# Patient Record
Sex: MALE | Race: WHITE | HISPANIC OR LATINO | Employment: UNEMPLOYED | URBAN - METROPOLITAN AREA
[De-identification: names, ages, dates, MRNs, and addresses within clinical notes are randomized per-mention and may not be internally consistent; named-entity substitution may affect disease eponyms.]

---

## 2017-01-01 ENCOUNTER — ALLSCRIPTS OFFICE VISIT (OUTPATIENT)
Dept: OTHER | Facility: OTHER | Age: 67
End: 2017-01-01

## 2017-01-04 ENCOUNTER — GENERIC CONVERSION - ENCOUNTER (OUTPATIENT)
Dept: OTHER | Facility: OTHER | Age: 67
End: 2017-01-04

## 2017-03-08 ENCOUNTER — HOSPITAL ENCOUNTER (OUTPATIENT)
Dept: CT IMAGING | Facility: HOSPITAL | Age: 67
Discharge: HOME | End: 2017-03-08
Attending: INTERNAL MEDICINE
Payer: COMMERCIAL

## 2017-03-08 DIAGNOSIS — R91.1 SOLITARY PULMONARY NODULE: ICD-10-CM

## 2017-03-08 PROCEDURE — 71250 CT THORAX DX C-: CPT

## 2017-10-27 NOTE — PROGRESS NOTES
Assessment  Assessed    1  Cardiac pacemaker in situ (V45 01) (Z95 0)   2  AV block (426 10) (I44 30)   3  Hypertension (401 9) (I10)   4  Prinzmetal angina (413 1) (I20 1)    Plan  Prinzmetal angina    · Nitroglycerin 0 4 MG Sublingual Tablet Sublingual; PLACE 1 TABLET UNDER  YOUR TONGUE EVERY 5 MINUTES AS NEEDED FOR CHEST PAIN   Rx By: Mauro Lala; Dispense: 0 Days ; #:30 Tablet Sublingual; Refill: 11; For: Prinzmetal angina; LAUREN = N; [facility] To: [id number]   · Follow-up visit in 6 months Evaluation and Treatment  Follow-up  Status: Hold For -  Scheduling  Requested for: 55RJK5910   Ordered; For: Prinzmetal angina; Ordered By: Mauro Lala Performed:  Due: 74WSS6302    Discussion/Summary  Cardiology Discussion Summary Free Text Note Form [facility]:   #1  Prinzmetal angina/coronary vasospasm: Review of hospital records, reviewed EKG in ICU at the time of significant chest pain and significant transient ST segment elevation, very much consistent with coronary vasospasm  Sublingual nitroglycerin did help  Continue nifedipine, isosorbide  Patient to carry sublingual nitroglycerin, which he does, and use as needed  Educated pt on use of SLNTG, as he uses it anytime he feels something funny in the chest, not necessarily pain  Patient is to call 911 if chest pain continues to accelerate or his refractory despite sublingual nitroglycerin use  History of symptomatic bradycardia, status post remote permanent pacemaker placement, [employer] device: We'll continue device interrogation  Prior interrogation report reviewed  Hypertension: Blood pressure elevated today, however patient did not take antihypertensives this AM before visit  Encouraged compliance, especially to prevent recurrent coronary vasospasm  COPD: F/U with pulmonary medicine  in 6 months, he will call if any changes  Would add verapamil if recurrent and refractory angina        Chief Complaint  Chief Complaint Free Text Note Form: Coronary vasospasm       History of Present Illness  [facility] [place] Free Text Note Form [facility]: This is a 61-year-old male with a significant history of permanent pacemaker implantation in the past, [employer] device, as well as a history of tobacco abuse and COPD, who has been following Dr Susan Sommer, initially seen in consultation October 2016  was hospitalized in late October into November 2016 with acute respiratory failure secondary to COPD exacerbation  While he was in the ICU, he had significant chest pain with marked ST segment elevation  Clinically, he was thought to have Prinzmetal angina secondary to beta agonist activity from multiple inhalers, with subsequent resolution of chest pain and resolution of EKG changes  He underwent cardiac catheterization on 11/2/2016 which revealed no evidence of obstructive CAD, in no angiographic evidence of vasospasm  was thereafter discharged on nifedipine and isosorbide  He was recommended to keep a symptom diary and carry around sublingual nitroglycerin wherever he goes  gets occasional chest pains that are sharp and uses SLNTG, but episodes of chest pains have not been very severe  Review of Systems  Cardiology Male ROS:     Cardiac: No complaints of chest pain, no palpitations, no fainiting -- and-- no syncope/fainting  Skin: No complaints of nonhealing sores or skin rash  Genitourinary: No complaints of recurrent urinary tract infections, frequent urination at night, difficult urination, blood in urine, kidney stones, loss of bladder control, no kidney or prostate problems, no erectile dysfunction  Psychological: No complaints of feeling depressed, anxiety, panic attacks, or difficulty concentrating  General: No complaints of trouble sleeping, lack of energy, fatigue, appetite changes, weight changes, fever, frequent infections, or night sweats  Respiratory: No complaints of shortness of breath, cough with sputum, or wheezing     HEENT: No complaints of serious problems, hearing problems, nose problems, throat problems, or snoring  Gastrointestinal: No complaints of liver problems, nausea, vomiting, heartburn, constipation, bloody stools, diarrhea, problems swallowing, adbominal pain, or rectal bleeding  Hematologic: No complaints of bleeding disorders, anemia, blood clots, or excessive brusing  Neurological: headaches, but-- no tingling-- and-- no dizziness   Musculoskeletal: arthritis, but-- no back pain-- and-- no swelling/pain       ROS Reviewed:   ROS reviewed  Active Problems  Problems    1  Acid reflux (530 81) (K21 9)   2  AV block (426 10) (I44 30)   3  Back pain (724 5) (M54 9)   4  Cardiac pacemaker in situ (V45 01) (Z95 0)   5  Cervical spondylosis (721 0) (M47 812)   6  Cervicalgia (723 1) (M54 2)   7  Chronic neck and back pain (723 1,724 5) (M54 2,M54 9)   8  COPD (chronic obstructive pulmonary disease) (496) (J44 9)   9  Hypertension (401 9) (I10)   10  Lung nodule (793 11) (R91 1)   11  Need for prophylactic vaccination and inoculation against influenza (V04 81) (Z23)   12  Peptic ulcer (533 90) (K27 9)   13  Prinzmetal angina (413 1) (I20 1)   14  Screening for colon cancer (V76 51) (Z12 11)   15  Smoking greater than 40 pack years (305 1) (F17 210)   16  Type 2 diabetes mellitus (250 00) (E11 9)    Past Medical History  Problems    1  History of acute bronchitis (V12 69) (Z87 09)   2  History of nicotine dependence (V15 82) (Z87 891)   3  History of Tobacco abuse (305 1) (Z72 0)  Active Problems And Past Medical History Reviewed: The active problems and past medical history were reviewed and updated today  Surgical History  Problems    1  History of Reported Hx Of Surgery For An Ulcer    Family History  Mother    1  Family history of malignant neoplasm (V16 9) (Z80 9)  Family History Reviewed: The family history was reviewed and updated today         Social History  Problems    · Alcohol use (V49 89) (Z78 9)   · Former smoker (A23 08) (I01 085)   · Smoking greater than 40 pack years (305 1) (F17 210)  Social History Reviewed: The social history was reviewed and updated today  Current Meds   1  Aspirin 81 MG Oral Tablet Delayed Release; take 1 tablet by mouth every day; Therapy: 71PCP5358 to (Evaluate:01Apr2017)  Requested for: 15Cuo4307; Last   Rx:14Vrp1497 Ordered   2  Atorvastatin Calcium 10 MG Oral Tablet; TAKE 1 TABLET DAILY; Therapy: (Recorded:09Nov2016) to Recorded   3  [employer] 2 Test In [employer]; TEST THREE TIMES DAILY; Therapy: 40XYS6273 to (Evaluate:19Mar2017)  Requested for: 46FFN5965; Last   Rx:24Txx2479 Ordered   4  BD Insulin Syringe Ultrafine 31G X 5/16 1 ML Miscellaneous; USE THREE TIMES DAILY; Therapy: 81NFY6625 to (Evaluate:66Bac1023)  Requested for: 46EED6750; Last   Rx:14Nov2016 Ordered   5  Blood Pressure Monitor Device; use for blood pressure recordings and mantain a log; Therapy: 25MJG9532 to (Evaluate:10Ekp3761)  Requested for: 74Pzt6451; Last   Rx:24Cqp9944 Ordered   6  Breo Ellipta 100-25 MCG/INH Inhalation Aerosol Powder Breath Activated; ONE   INHALATION DAILY  AFTER INHALATION RINSE MOUTH WITH WATER & SPIT  USE SAME   TIME EACH DAY, NO MORE THAN 1 TIME IN 24 HOURS; Therapy: 37XKL7102 to (Last ML:58NRM9413)  Requested for: 54FVG2946 Ordered   7  Gabapentin 300 MG Oral Capsule; TAKE 1 CAPSULE BY MOUTH TWICE DAILY    Requested for: 86CVN1645; Last Rx:87Aug6952 Ordered   8  Ipratropium-Albuterol 0 5-2 5 (3) MG/3ML Inhalation Solution; USE 1 UNIT DOSE IN   NEBULIZER EVERY 4 HOURS AS NEEDED; Therapy: 88VRQ9397 to (Last BK:48XIW1606)  Requested for: 24QXF9341 Ordered   9  Ipratropium-Albuterol 0 5-2 5 (3) MG/3ML Inhalation Solution; USE 1 UNIT DOSE IN   NEBULIZER EVERY 4 HOURS AS NEEDED; Therapy: 23FTU7846 to (Last Rx:14Nov2016)  Requested for: 14YSF4769 Ordered   10   Isosorbide Mononitrate ER 60 MG Oral Tablet Extended Release 24 Hour; TAKE 1    TABLET ONCE DAILY  Requested for: 02Dec2016; Last Rx:02Dec2016 Ordered   11  Levemir 100 UNIT/ML Subcutaneous Solution; Inject 30 units in the evening; Therapy: 13LRJ5400 to Recorded   12  Lisinopril 20 MG Oral Tablet; TAKE 1 TABLET DAILY  Requested for: 19NAO7468; Last    Rx:39Lra1011 Ordered   13  MetFORMIN HCl - 850 MG Oral Tablet; TAKE 1 TABLET BY MOUTH DAILY WITH    BREAKFEST  Requested for: 69MSW7280; Last MS:24IIM3505 Ordered   14  Mucinex 600 MG Oral Tablet Extended Release 12 Hour; TAKE 1 TABLET EVERY 12    HOURS; Therapy: (Recorded:09Nov2016) to Recorded   15  Nitroglycerin 0 4 MG Sublingual Tablet Sublingual; PLACE 1 TABLET UNDER YOUR    TONGUE EVERY 5 MINUTES AS NEEDED FOR CHEST PAIN;    Therapy: (Recorded:09Nov2016) to Recorded   16  Omeprazole 20 MG Oral Capsule Delayed Release; take 1 capsule by mouth once daily; Therapy: 98JNN2626 to (Evaluate:01Apr2017)  Requested for: 88Rjt0970; Last    Rx:74Uyo0534 Ordered  Medication List Reviewed: The medication list was reviewed and updated today  Allergies  Medication    1  No Known Drug Allergies  Non-Medication    2  No Known Environmental Allergies   3  No Known Food Allergies    Vitals  Vital Signs    Recorded: 65CGW3818 08:39AM   Heart Rate 80   Systolic 800   Diastolic 80   Height 5 ft 7 in   Weight 204 lb    BMI Calculated 31 95   BSA Calculated 2 04     Physical Exam    Constitutional   General appearance: No acute distress, well appearing and well nourished  Eyes   Conjunctiva and Sclera examination: Conjunctiva pink, sclera anicteric  Ears, Nose, Mouth, and Throat - Oropharynx: Clear, nares are clear, mucous membranes are moist    Neck   Neck and thyroid: Normal, supple, trachea midline, no thyromegaly  Pulmonary   Respiratory effort: No increased work of breathing or signs of respiratory distress  Auscultation of lungs: Clear to auscultation, no rales, no rhonchi, no wheezing, good air movement      Cardiovascular   Auscultation of heart: Normal rate and rhythm, normal S1 and S2, no murmurs  Carotid pulses: Normal, 2+ bilaterally  Peripheral vascular exam: Normal pulses throughout, no tenderness, erythema or swelling  Pedal pulses: Normal, 2+ bilaterally  Examination of extremities for edema and/or varicosities: Normal     Abdomen   Abdomen: Non-tender and no distention  Liver and spleen: No hepatomegaly or splenomegaly  Musculoskeletal Gait and station: Normal gait  -- Digits and nails: Normal without clubbing or cyanosis  -- Inspection/palpation of joints, bones, and muscles: Normal, ROM normal     Skin - Skin and subcutaneous tissue: Normal without rashes or lesions  Skin is warm and well perfused, normal turgor  Neurologic - Cranial nerves: II - XII intact  -- Speech: Normal     Psychiatric - Orientation to person, place, and time: Normal -- Mood and affect: Normal       Future Appointments    Date/Time Provider Specialty Site   10/10/2017 01:30 PM Cardiology, [address] [facility]     Signatures   Electronically signed by : Florence DO Darek; Oct  4 2017  9:07AM EST                       (Author)

## 2018-01-01 ENCOUNTER — CLINICAL SUPPORT (OUTPATIENT)
Dept: CARDIOLOGY CLINIC | Facility: CLINIC | Age: 68
End: 2018-01-01
Payer: COMMERCIAL

## 2018-01-01 ENCOUNTER — APPOINTMENT (EMERGENCY)
Dept: RADIOLOGY | Facility: HOSPITAL | Age: 68
DRG: 208 | End: 2018-01-01
Payer: COMMERCIAL

## 2018-01-01 ENCOUNTER — TELEPHONE (OUTPATIENT)
Dept: CARDIOLOGY CLINIC | Facility: CLINIC | Age: 68
End: 2018-01-01

## 2018-01-01 ENCOUNTER — TRANSCRIBE ORDERS (OUTPATIENT)
Dept: ADMINISTRATIVE | Facility: HOSPITAL | Age: 68
End: 2018-01-01

## 2018-01-01 ENCOUNTER — HOSPITAL ENCOUNTER (INPATIENT)
Facility: HOSPITAL | Age: 68
LOS: 1 days | DRG: 208 | End: 2018-04-28
Attending: EMERGENCY MEDICINE | Admitting: INTERNAL MEDICINE
Payer: COMMERCIAL

## 2018-01-01 ENCOUNTER — ALLSCRIPTS OFFICE VISIT (OUTPATIENT)
Dept: OTHER | Facility: OTHER | Age: 68
End: 2018-01-01

## 2018-01-01 VITALS
OXYGEN SATURATION: 99 % | WEIGHT: 204 LBS | SYSTOLIC BLOOD PRESSURE: 124 MMHG | HEIGHT: 67 IN | BODY MASS INDEX: 32.02 KG/M2 | TEMPERATURE: 97.7 F | RESPIRATION RATE: 16 BRPM | DIASTOLIC BLOOD PRESSURE: 70 MMHG | HEART RATE: 67 BPM

## 2018-01-01 VITALS
RESPIRATION RATE: 156 BRPM | BODY MASS INDEX: 33.25 KG/M2 | SYSTOLIC BLOOD PRESSURE: 108 MMHG | HEART RATE: 138 BPM | DIASTOLIC BLOOD PRESSURE: 51 MMHG | TEMPERATURE: 98.5 F | WEIGHT: 212.3 LBS | OXYGEN SATURATION: 98 %

## 2018-01-01 VITALS
TEMPERATURE: 98.4 F | HEART RATE: 63 BPM | SYSTOLIC BLOOD PRESSURE: 162 MMHG | OXYGEN SATURATION: 96 % | WEIGHT: 213 LBS | BODY MASS INDEX: 33.43 KG/M2 | DIASTOLIC BLOOD PRESSURE: 74 MMHG | HEIGHT: 67 IN | RESPIRATION RATE: 16 BRPM

## 2018-01-01 VITALS
SYSTOLIC BLOOD PRESSURE: 150 MMHG | DIASTOLIC BLOOD PRESSURE: 80 MMHG | HEART RATE: 80 BPM | BODY MASS INDEX: 32.02 KG/M2 | WEIGHT: 204 LBS | HEIGHT: 67 IN

## 2018-01-01 DIAGNOSIS — J44.1 COPD EXACERBATION (HCC): ICD-10-CM

## 2018-01-01 DIAGNOSIS — Z95.0 CARDIAC PACEMAKER: Primary | ICD-10-CM

## 2018-01-01 DIAGNOSIS — J96.00 ACUTE RESPIRATORY FAILURE (HCC): Primary | ICD-10-CM

## 2018-01-01 DIAGNOSIS — R91.1 COIN LESION: Primary | ICD-10-CM

## 2018-01-01 DIAGNOSIS — I45.2 BIFASCICULAR BLOCK: ICD-10-CM

## 2018-01-01 DIAGNOSIS — I25.10 CORONARY ARTERY DISEASE INVOLVING NATIVE HEART WITHOUT ANGINA PECTORIS, UNSPECIFIED VESSEL OR LESION TYPE: Primary | ICD-10-CM

## 2018-01-01 DIAGNOSIS — R55 SYNCOPE AND COLLAPSE: ICD-10-CM

## 2018-01-01 DIAGNOSIS — I10 HYPERTENSION, UNSPECIFIED TYPE: Primary | ICD-10-CM

## 2018-01-01 LAB
ANION GAP BLD CALC-SCNC: 14 MMOL/L (ref 4–13)
ANION GAP SERPL CALCULATED.3IONS-SCNC: 8 MMOL/L (ref 4–13)
ARTERIAL PATENCY WRIST A: YES
BASE EXCESS BLDA CALC-SCNC: -3.9 MMOL/L
BASOPHILS # BLD MANUAL: 0 THOUSAND/UL (ref 0–0.1)
BASOPHILS NFR MAR MANUAL: 0 % (ref 0–1)
BUN BLD-MCNC: 23 MG/DL (ref 5–25)
BUN SERPL-MCNC: 21 MG/DL (ref 5–25)
CA-I BLD-SCNC: 1.16 MMOL/L (ref 1.12–1.32)
CALCIUM SERPL-MCNC: 8.5 MG/DL (ref 8.3–10.1)
CHLORIDE BLD-SCNC: 102 MMOL/L (ref 100–108)
CHLORIDE SERPL-SCNC: 102 MMOL/L (ref 100–108)
CO2 SERPL-SCNC: 30 MMOL/L (ref 21–32)
CREAT BLD-MCNC: 1.1 MG/DL (ref 0.6–1.3)
CREAT SERPL-MCNC: 1.35 MG/DL (ref 0.6–1.3)
EOSINOPHIL # BLD MANUAL: 0.22 THOUSAND/UL (ref 0–0.4)
EOSINOPHIL NFR BLD MANUAL: 1 % (ref 0–6)
ERYTHROCYTE [DISTWIDTH] IN BLOOD BY AUTOMATED COUNT: 14.4 % (ref 11.6–15.1)
GFR SERPL CREATININE-BSD FRML MDRD: 55 ML/MIN/1.73SQ M
GFR SERPL CREATININE-BSD FRML MDRD: 70 ML/MIN/1.73SQ M
GLUCOSE SERPL-MCNC: 287 MG/DL (ref 65–140)
GLUCOSE SERPL-MCNC: 292 MG/DL (ref 65–140)
HCO3 BLDA-SCNC: 23.7 MMOL/L (ref 22–28)
HCT VFR BLD AUTO: 50.8 % (ref 36.5–49.3)
HCT VFR BLD CALC: 44 % (ref 36.5–49.3)
HGB BLD-MCNC: 15.5 G/DL (ref 12–17)
HGB BLDA-MCNC: 15 G/DL (ref 12–17)
HOROWITZ INDEX BLDA+IHG-RTO: 70 MM[HG]
LACTATE SERPL-SCNC: 1.5 MMOL/L (ref 0.5–2)
LACTATE SERPL-SCNC: 3.1 MMOL/L (ref 0.5–2)
LYMPHOCYTES # BLD AUTO: 33 % (ref 14–44)
LYMPHOCYTES # BLD AUTO: 7.18 THOUSAND/UL (ref 0.6–4.47)
MCH RBC QN AUTO: 25.7 PG (ref 26.8–34.3)
MCHC RBC AUTO-ENTMCNC: 30.5 G/DL (ref 31.4–37.4)
MCV RBC AUTO: 84 FL (ref 82–98)
MONOCYTES # BLD AUTO: 0.44 THOUSAND/UL (ref 0–1.22)
MONOCYTES NFR BLD: 2 % (ref 4–12)
NEUTROPHILS # BLD MANUAL: 13.93 THOUSAND/UL (ref 1.85–7.62)
NEUTS SEG NFR BLD AUTO: 64 % (ref 43–75)
NT-PROBNP SERPL-MCNC: 169 PG/ML
O2 CT BLDA-SCNC: 18.2 ML/DL (ref 16–23)
OXYHGB MFR BLDA: 99.4 % (ref 94–97)
PCO2 BLD: 28 MMOL/L (ref 21–32)
PCO2 BLDA: 54.4 MM HG (ref 36–44)
PEEP RESPIRATORY: 5 CM[H2O]
PH BLDA: 7.26 [PH] (ref 7.35–7.45)
PLATELET # BLD AUTO: 293 THOUSANDS/UL (ref 149–390)
PLATELET BLD QL SMEAR: ADEQUATE
PMV BLD AUTO: 10 FL (ref 8.9–12.7)
PO2 BLDA: 372.1 MM HG (ref 75–129)
POTASSIUM BLD-SCNC: 4.3 MMOL/L (ref 3.5–5.3)
POTASSIUM SERPL-SCNC: 4.4 MMOL/L (ref 3.5–5.3)
RBC # BLD AUTO: 6.02 MILLION/UL (ref 3.88–5.62)
RBC MORPH BLD: NORMAL
SODIUM BLD-SCNC: 139 MMOL/L (ref 136–145)
SODIUM SERPL-SCNC: 140 MMOL/L (ref 136–145)
SPECIMEN SOURCE: ABNORMAL
SPECIMEN SOURCE: ABNORMAL
TOTAL CELLS COUNTED SPEC: 100
TROPONIN I SERPL-MCNC: 0.02 NG/ML
VENT AC: 16
VENT- AC: AC
VT SETTING VENT: 500 ML
WBC # BLD AUTO: 21.77 THOUSAND/UL (ref 4.31–10.16)

## 2018-01-01 PROCEDURE — 99291 CRITICAL CARE FIRST HOUR: CPT

## 2018-01-01 PROCEDURE — 36600 WITHDRAWAL OF ARTERIAL BLOOD: CPT

## 2018-01-01 PROCEDURE — 94002 VENT MGMT INPAT INIT DAY: CPT

## 2018-01-01 PROCEDURE — 71045 X-RAY EXAM CHEST 1 VIEW: CPT

## 2018-01-01 PROCEDURE — 84484 ASSAY OF TROPONIN QUANT: CPT | Performed by: EMERGENCY MEDICINE

## 2018-01-01 PROCEDURE — 93294 REM INTERROG EVL PM/LDLS PM: CPT | Performed by: INTERNAL MEDICINE

## 2018-01-01 PROCEDURE — 85027 COMPLETE CBC AUTOMATED: CPT | Performed by: EMERGENCY MEDICINE

## 2018-01-01 PROCEDURE — 94660 CPAP INITIATION&MGMT: CPT

## 2018-01-01 PROCEDURE — 87040 BLOOD CULTURE FOR BACTERIA: CPT | Performed by: EMERGENCY MEDICINE

## 2018-01-01 PROCEDURE — 93296 REM INTERROG EVL PM/IDS: CPT | Performed by: INTERNAL MEDICINE

## 2018-01-01 PROCEDURE — 99236 HOSP IP/OBS SAME DATE HI 85: CPT | Performed by: NURSE PRACTITIONER

## 2018-01-01 PROCEDURE — 94644 CONT INHLJ TX 1ST HOUR: CPT

## 2018-01-01 PROCEDURE — 80048 BASIC METABOLIC PNL TOTAL CA: CPT | Performed by: EMERGENCY MEDICINE

## 2018-01-01 PROCEDURE — 83605 ASSAY OF LACTIC ACID: CPT | Performed by: NURSE PRACTITIONER

## 2018-01-01 PROCEDURE — 85007 BL SMEAR W/DIFF WBC COUNT: CPT | Performed by: EMERGENCY MEDICINE

## 2018-01-01 PROCEDURE — 96375 TX/PRO/DX INJ NEW DRUG ADDON: CPT

## 2018-01-01 PROCEDURE — 5A1935Z RESPIRATORY VENTILATION, LESS THAN 24 CONSECUTIVE HOURS: ICD-10-PCS | Performed by: EMERGENCY MEDICINE

## 2018-01-01 PROCEDURE — 0BH17EZ INSERTION OF ENDOTRACHEAL AIRWAY INTO TRACHEA, VIA NATURAL OR ARTIFICIAL OPENING: ICD-10-PCS | Performed by: EMERGENCY MEDICINE

## 2018-01-01 PROCEDURE — 96365 THER/PROPH/DIAG IV INF INIT: CPT

## 2018-01-01 PROCEDURE — 80047 BASIC METABLC PNL IONIZED CA: CPT

## 2018-01-01 PROCEDURE — 83605 ASSAY OF LACTIC ACID: CPT | Performed by: EMERGENCY MEDICINE

## 2018-01-01 PROCEDURE — 36415 COLL VENOUS BLD VENIPUNCTURE: CPT | Performed by: EMERGENCY MEDICINE

## 2018-01-01 PROCEDURE — 96361 HYDRATE IV INFUSION ADD-ON: CPT

## 2018-01-01 PROCEDURE — 83880 ASSAY OF NATRIURETIC PEPTIDE: CPT | Performed by: EMERGENCY MEDICINE

## 2018-01-01 PROCEDURE — 85014 HEMATOCRIT: CPT

## 2018-01-01 PROCEDURE — 82805 BLOOD GASES W/O2 SATURATION: CPT | Performed by: EMERGENCY MEDICINE

## 2018-01-01 PROCEDURE — 93005 ELECTROCARDIOGRAM TRACING: CPT

## 2018-01-01 RX ORDER — HYDRALAZINE HYDROCHLORIDE 20 MG/ML
10 INJECTION INTRAMUSCULAR; INTRAVENOUS EVERY 6 HOURS PRN
Status: DISCONTINUED | OUTPATIENT
Start: 2018-01-01 | End: 2018-01-01 | Stop reason: HOSPADM

## 2018-01-01 RX ORDER — MAGNESIUM SULFATE HEPTAHYDRATE 40 MG/ML
2 INJECTION, SOLUTION INTRAVENOUS ONCE
Status: COMPLETED | OUTPATIENT
Start: 2018-01-01 | End: 2018-01-01

## 2018-01-01 RX ORDER — METHYLPREDNISOLONE SODIUM SUCCINATE 40 MG/ML
40 INJECTION, POWDER, LYOPHILIZED, FOR SOLUTION INTRAMUSCULAR; INTRAVENOUS EVERY 6 HOURS SCHEDULED
Status: DISCONTINUED | OUTPATIENT
Start: 2018-01-01 | End: 2018-01-01 | Stop reason: HOSPADM

## 2018-01-01 RX ORDER — KETAMINE HYDROCHLORIDE 50 MG/ML
150 INJECTION, SOLUTION, CONCENTRATE INTRAMUSCULAR; INTRAVENOUS ONCE
Status: DISCONTINUED | OUTPATIENT
Start: 2018-01-01 | End: 2018-01-01

## 2018-01-01 RX ORDER — CHLORHEXIDINE GLUCONATE 0.12 MG/ML
15 RINSE ORAL EVERY 12 HOURS SCHEDULED
Status: DISCONTINUED | OUTPATIENT
Start: 2018-01-01 | End: 2018-01-01 | Stop reason: HOSPADM

## 2018-01-01 RX ORDER — PROPOFOL 10 MG/ML
80 INJECTION, EMULSION INTRAVENOUS ONCE
Status: COMPLETED | OUTPATIENT
Start: 2018-01-01 | End: 2018-01-01

## 2018-01-01 RX ORDER — METHYLPREDNISOLONE SODIUM SUCCINATE 125 MG/2ML
125 INJECTION, POWDER, LYOPHILIZED, FOR SOLUTION INTRAMUSCULAR; INTRAVENOUS ONCE
Status: COMPLETED | OUTPATIENT
Start: 2018-01-01 | End: 2018-01-01

## 2018-01-01 RX ORDER — SODIUM CHLORIDE FOR INHALATION 0.9 %
3 VIAL, NEBULIZER (ML) INHALATION ONCE
Status: COMPLETED | OUTPATIENT
Start: 2018-01-01 | End: 2018-01-01

## 2018-01-01 RX ORDER — ASPIRIN 81 MG/1
81 TABLET, CHEWABLE ORAL DAILY
Status: DISCONTINUED | OUTPATIENT
Start: 2018-04-29 | End: 2018-01-01 | Stop reason: HOSPADM

## 2018-01-01 RX ORDER — KETAMINE HYDROCHLORIDE 50 MG/ML
INJECTION, SOLUTION, CONCENTRATE INTRAMUSCULAR; INTRAVENOUS
Status: DISCONTINUED
Start: 2018-01-01 | End: 2018-01-01 | Stop reason: HOSPADM

## 2018-01-01 RX ORDER — PANTOPRAZOLE SODIUM 40 MG/1
40 INJECTION, POWDER, FOR SOLUTION INTRAVENOUS
Status: DISCONTINUED | OUTPATIENT
Start: 2018-04-29 | End: 2018-01-01 | Stop reason: HOSPADM

## 2018-01-01 RX ORDER — ALBUTEROL SULFATE 2.5 MG/3ML
10 SOLUTION RESPIRATORY (INHALATION) ONCE
Status: COMPLETED | OUTPATIENT
Start: 2018-01-01 | End: 2018-01-01

## 2018-01-01 RX ORDER — LISINOPRIL 20 MG/1
20 TABLET ORAL DAILY
Qty: 30 TABLET | Refills: 5 | Status: SHIPPED | OUTPATIENT
Start: 2018-01-01 | End: 2018-01-01

## 2018-01-01 RX ORDER — SODIUM BICARBONATE 84 MG/ML
INJECTION, SOLUTION INTRAVENOUS CODE/TRAUMA/SEDATION MEDICATION
Status: COMPLETED | OUTPATIENT
Start: 2018-01-01 | End: 2018-01-01

## 2018-01-01 RX ORDER — PROPOFOL 10 MG/ML
5-50 INJECTION, EMULSION INTRAVENOUS
Status: DISCONTINUED | OUTPATIENT
Start: 2018-01-01 | End: 2018-01-01 | Stop reason: HOSPADM

## 2018-01-01 RX ORDER — INSULIN GLARGINE 100 [IU]/ML
30 INJECTION, SOLUTION SUBCUTANEOUS
Status: DISCONTINUED | OUTPATIENT
Start: 2018-01-01 | End: 2018-01-01 | Stop reason: HOSPADM

## 2018-01-01 RX ORDER — PROPOFOL 10 MG/ML
INJECTION, EMULSION INTRAVENOUS
Status: COMPLETED
Start: 2018-01-01 | End: 2018-01-01

## 2018-01-01 RX ORDER — EPINEPHRINE 0.1 MG/ML
SYRINGE (ML) INJECTION CODE/TRAUMA/SEDATION MEDICATION
Status: COMPLETED | OUTPATIENT
Start: 2018-01-01 | End: 2018-01-01

## 2018-01-01 RX ORDER — NITROGLYCERIN 0.4 MG/1
TABLET SUBLINGUAL
Qty: 25 TABLET | Refills: 1 | Status: SHIPPED | OUTPATIENT
Start: 2018-01-01

## 2018-01-01 RX ORDER — NITROGLYCERIN 0.4 MG/1
0.4 TABLET SUBLINGUAL ONCE
Status: COMPLETED | OUTPATIENT
Start: 2018-01-01 | End: 2018-01-01

## 2018-01-01 RX ORDER — HYDROCHLOROTHIAZIDE 12.5 MG/1
12.5 TABLET ORAL DAILY
COMMUNITY

## 2018-01-01 RX ORDER — LEVALBUTEROL 1.25 MG/.5ML
1.25 SOLUTION, CONCENTRATE RESPIRATORY (INHALATION) EVERY 6 HOURS PRN
Status: DISCONTINUED | OUTPATIENT
Start: 2018-01-01 | End: 2018-01-01 | Stop reason: HOSPADM

## 2018-01-01 RX ORDER — SUCCINYLCHOLINE CHLORIDE 20 MG/ML
150 INJECTION INTRAMUSCULAR; INTRAVENOUS ONCE
Status: COMPLETED | OUTPATIENT
Start: 2018-01-01 | End: 2018-01-01

## 2018-01-01 RX ORDER — KETAMINE HYDROCHLORIDE 50 MG/ML
INJECTION, SOLUTION, CONCENTRATE INTRAMUSCULAR; INTRAVENOUS
Status: DISCONTINUED
Start: 2018-01-01 | End: 2018-01-01 | Stop reason: WASHOUT

## 2018-01-01 RX ADMIN — Medication 150 MG: at 15:14

## 2018-01-01 RX ADMIN — EPINEPHRINE 1 MG: 0.1 INJECTION, SOLUTION ENDOTRACHEAL; INTRACARDIAC; INTRAVENOUS at 17:45

## 2018-01-01 RX ADMIN — Medication 75 MG: at 15:34

## 2018-01-01 RX ADMIN — SODIUM BICARBONATE 50 MEQ: 84 INJECTION, SOLUTION INTRAVENOUS at 17:52

## 2018-01-01 RX ADMIN — PROPOFOL 80 MG: 10 INJECTION, EMULSION INTRAVENOUS at 15:41

## 2018-01-01 RX ADMIN — SODIUM CHLORIDE 1000 ML: 0.9 INJECTION, SOLUTION INTRAVENOUS at 15:55

## 2018-01-01 RX ADMIN — Medication 150 MG: at 15:15

## 2018-01-01 RX ADMIN — MAGNESIUM SULFATE HEPTAHYDRATE 2 G: 40 INJECTION, SOLUTION INTRAVENOUS at 14:30

## 2018-01-01 RX ADMIN — EPINEPHRINE 1 MG: 0.1 INJECTION, SOLUTION ENDOTRACHEAL; INTRACARDIAC; INTRAVENOUS at 17:59

## 2018-01-01 RX ADMIN — EPINEPHRINE 1 MG: 0.1 INJECTION, SOLUTION ENDOTRACHEAL; INTRACARDIAC; INTRAVENOUS at 17:51

## 2018-01-01 RX ADMIN — METHYLPREDNISOLONE SODIUM SUCCINATE 125 MG: 125 INJECTION, POWDER, FOR SOLUTION INTRAMUSCULAR; INTRAVENOUS at 14:28

## 2018-01-01 RX ADMIN — EPINEPHRINE 1 MG: 0.1 INJECTION, SOLUTION ENDOTRACHEAL; INTRACARDIAC; INTRAVENOUS at 18:02

## 2018-01-01 RX ADMIN — PROPOFOL 10 MCG/KG/MIN: 10 INJECTION, EMULSION INTRAVENOUS at 15:54

## 2018-01-01 RX ADMIN — EPINEPHRINE 1 MG: 0.1 INJECTION, SOLUTION ENDOTRACHEAL; INTRACARDIAC; INTRAVENOUS at 17:48

## 2018-01-01 RX ADMIN — EPINEPHRINE 1 MG: 0.1 INJECTION, SOLUTION ENDOTRACHEAL; INTRACARDIAC; INTRAVENOUS at 17:55

## 2018-01-01 RX ADMIN — ISODIUM CHLORIDE 3 ML: 0.03 SOLUTION RESPIRATORY (INHALATION) at 14:42

## 2018-01-01 RX ADMIN — IPRATROPIUM BROMIDE 1 MG: 0.5 SOLUTION RESPIRATORY (INHALATION) at 14:42

## 2018-01-01 RX ADMIN — NITROGLYCERIN 0.4 MG: 0.4 TABLET SUBLINGUAL at 14:37

## 2018-01-01 RX ADMIN — EPINEPHRINE 1 MG: 0.1 INJECTION, SOLUTION ENDOTRACHEAL; INTRACARDIAC; INTRAVENOUS at 18:06

## 2018-01-01 RX ADMIN — SODIUM CHLORIDE 1000 ML: 0.9 INJECTION, SOLUTION INTRAVENOUS at 14:34

## 2018-01-01 RX ADMIN — PROPOFOL 80 MG: 10 INJECTION, EMULSION INTRAVENOUS at 15:52

## 2018-01-01 RX ADMIN — ALBUTEROL SULFATE 10 MG: 2.5 SOLUTION RESPIRATORY (INHALATION) at 14:41

## 2018-01-01 RX ADMIN — EPINEPHRINE 1 MG: 0.1 INJECTION, SOLUTION ENDOTRACHEAL; INTRACARDIAC; INTRAVENOUS at 18:05

## 2018-01-01 RX ADMIN — CEFEPIME HYDROCHLORIDE 1000 MG: 1 INJECTION, SOLUTION INTRAVENOUS at 17:26

## 2018-01-09 NOTE — PROCEDURES
Procedures by Christy Olvera MD at 12/21/2016 11:59 PM      Author:  Christy Olvera MD Service:  Pulmonology Author Type:  Physician     Filed:  1/6/2017  8:12 AM Date of Service:  12/21/2016 11:59 PM Status:  Signed     :  Christy Olvera MD (Physician)         Pre-procedure Diagnoses:       1  Chronic obstructive pulmonary disease, unspecified COPD type [J44 9]                Procedures:       1  PFT WITH SIX MINUTE WALK [[id number] (Custom)]                   Diagnosis: COPD  Requesting provider: Dr Roverto Petty      Results:  Patient gave good effort and cooperation  The testing met ATS Standards for Acceptability and Repeatability  Baseline oxygen saturation was 94% on room air and hemoglobin was 12 3 for purposes of  the testing    Spirometry:  FEV1/FVC Ratio is 37%  FEV1 is 0 66 L which is 20% predicted  FVC is 1 77 L which is 40% predicted     There was a 13% improvement in the FEV1 but this was only a 9 mm improvement and therefore is not significant    Flow volume loop:  Severe obstruction appearance    Lung volumes:  Values are not valid for interpretation    Diffusing capacity:  Severely reduced at 37% predicted  Airway resistance:  Elevated as indicated by the specific airway conductance  6 Minute WalkTest:  Procedure was performed on room air  Baseline level of shortness of breath was informed dyspnea score of 0  Saturation was 95%  Patient ambulated for the full 6 minutes  He did not demonstrate any evidence  of desaturation  Heart rate ranged from 69 to a peak of 86 bpm during the walk  Lowest oxygen saturation was 93%  At the end of the walk level of dyspnea was a 4  He walks a total of 1050 feet    IMPRESSION:  1  Severe obstructive ventilatory flow limitation without significant bronchodilators response  2   Invalid lung volumes, unable to be interpreted    3   Severe diffusing capacity impairment  4   6 minute walk test which demonstrates no significant exertional desaturation  Jimmie Sellers MD    Portions of the record may have been created with voice recognition software  Occasional wrong word or sound a like substitutions may have occurred due to the inherent limitations of voice  recognition software  Read the chart carefully and recognize, using context, where substitutions have occurred  Mariah LINARES    Jan 6 2017  8:12AM [facility] Standard Time

## 2018-01-10 NOTE — MISCELLANEOUS
History of Present Illness  COPD Hospital Discharge Initial Follow-Up Call: The patient is being contacted for follow-up after hospitalization  The date of discharge is 11/3/16  I spoke with patient  Patient was identified as medium risk for readmission per risk stratification  The patient was discharged to home  The patient is a former smoker with a pack year history  The patient quit smoking in 2016  The patient is experiencing the following symptoms: no wheezing, no cough, no dyspnea, no fever and not coughing up sputum  Zone tool status is yellow  The following topics were reviewed:  rescue vs  maintenance inhalers, smoking cessation, energy conservation, physician follow-ups and medication compliance  Narrative Summary:  Patient was discharged from the hospital yesterday, and filled prescriptions for inhalers (Advair, Albuterol)  He will make appt with Pulmonary at [facility] to decide if nebulizer therapy is needed  No PFT's on record  Discussed importance of Pulmonary management  PCP appt already made for next week  Patient quit smoking after his previous hospitalization in September  He feels well at this time with no apparent respiratory distress  Current Meds    1  Esomeprazole Magnesium 40 MG Oral Capsule Delayed Release (NexIUM); TAKE 1   CAPSULE ONCE DAILY  Requested for: 16KTQ5561; Last Rx:57Jsz6243 Ordered    2  Gabapentin 300 MG Oral Capsule; take 1 capsule by mouth at bedtime  Requested for:   12GFU6053; Last Rx:83Kxj3427 Ordered    3  Advair Diskus 250-50 MCG/DOSE Inhalation Aerosol Powder Breath Activated; inhale 1   puff twice daily  Requested for: 92SLA0136; Last Rx:25Oct2016 Ordered   4  Combivent Respimat  MCG/ACT Inhalation Aerosol Solution; inhale 1 puff Q6h   PRN as needed for shortness of breath; Therapy: 95CEV0712 to (Last Murtis Block)  Requested for: 25Oct2016 Ordered   5   Montelukast Sodium 10 MG Oral Tablet (Singulair); TAKE 1 TABLET DAILY  Requested for: 51HKW3262; Last Rx:32Dil3345 Ordered    6  Lisinopril 10 MG Oral Tablet; TAKE 1 TABLET DAILY  Requested for: 21BAF0154; Last   Rx:20Sep2016 Ordered    7  Nicotine 14 MG/24HR Transdermal Patch 24 Hour; APPLY 1 PATCH ONTO SKIN DAILY   FOR 30 DAYS; Therapy: (Recorded:09Sep2016) to Recorded    8  [employer] 2 Test In [place]; TEST THREE TIMES DAILY; Therapy: 54UFY2696 to (Evaluate:19Mar2017)  Requested for: 60AAX4250; Last   Rx:20Sep2016 Ordered   9  Levemir 100 UNIT/ML Subcutaneous Solution; INJECT 35 UNITS AT BEDTIME; Therapy: (Recorded:24Oct2016) to Recorded   10  MetFORMIN HCl - 850 MG Oral Tablet; TAKE 1 TABLET BY MOUTH DAILY WITH    BREAKFEST; Therapy: (Recorded:09Sep2016) to Recorded    11  Hydrocodone-Acetaminophen  MG Oral Tablet; Therapy: 11UCN7298 to Recorded   12  PredniSONE 10 MG Oral Tablet; Therapy: 01FNI6737 to Recorded    Allergies    1  No Known Drug Allergies    2  No Known Environmental Allergies   3  No Known Food Allergies    Future Appointments    Date/Time Provider Specialty Site   11/09/2016 09:30 AM BIB [place]  Internal Medicine [facility]   11/21/2016 09:15 AM BIB [place]  Internal Medicine [facility]   10/10/2017 01:30 PM Cardiology, [address] [place] [address] CARDIOLOGY  [place]   01/09/2017 03:00 PM Cardiology, Device Remote   [address]   04/10/2017 10:30 AM Cardiology, Device Remote   [address]   11/17/2016 09:00 AM Denis Hollingsworth DO Pain Management [facility]   12/21/2016 03:00 PM Adele Pham DO Cardiology  CARDIOLOGY  [place]   04/06/2017 01:40 PM Foster Al DO Cardiology [address] [place] [address]     Signatures   Electronically signed by :  RT Andrew; Nov 4 2016 12:35PM EST                       (Author)

## 2018-01-10 NOTE — RESULT NOTES
Verified Results  * CT CHEST WO CONTRAST 05Wkr6819 11:48AM Eduardo Garay Order Number: [id number]   Performing Comments: low dose CT   - Patient Instructions: Praveena     Test Name Result Flag Reference   CT CHEST WO CONTRAST (Report)     CT CHEST WITHOUT IV CONTRAST     INDICATION: Smoking, evaluate for lung nodule     COMPARISON: None  TECHNIQUE: CT examination of the chest was performed without intravenous contrast  Axial, sagittal and coronal reformatted images were submitted for interpretation  Coronal thick section MIP (maximal intensity projection) images were also created  This examination, like all CT scans performed in the [facility], was performed utilizing techniques to minimize radiation dose exposure, including the use of iterative reconstruction and automated exposure control  FINDINGS:     LUNGS: A right upper lobe lung nodule is seen which measures 6 5 mm, seen in image 11 of series 2 additional linear density with nodular configuration is seen measuring about 6 mm, seen in image 44 of series 602   A granuloma is seen in the left upper lobe, in subpleural location in image 45 of series 602   Perifissural nodule is seen in the right upper lobe which measures about 3 8 mm, seen in image 26   The trachea and central bronchial patent     PLEURA: Unremarkable  HEART/GREAT VESSELS: Unremarkable for patient's age  MEDIASTINUM AND CHARLETTE: Small pretracheal, subcarinal lymph nodes do not meet the criteria for pathological enlargement on the bases of size   There is no contour deforming hilar lymph node enlargement seen     CHEST WALL AND LOWER NECK: There is no significant axillary lymph node enlargement seen   There is no significant lower cervical lymph node enlargement seen     VISUALIZED STRUCTURES IN THE UPPER ABDOMEN: Unenhanced spleen, pancreas, adrenal glands appear unremarkable     OSSEOUS STRUCTURES: No acute fracture   No destructive osseous lesion         IMPRESSION:     A 6 5 mm nodule seen in the right apex, can be evaluated for stability with the follow-up CT at 3 months as per Fleischner Society guidelines       ##sigslh##sigslh      ##fuslh3##fuslh3       Workstation performed: [id number]     Signed by:   Loren Jang MD   12/23/16

## 2018-01-10 NOTE — PROGRESS NOTES
Assessment   1  COPD (chronic obstructive pulmonary disease) (496) (J44 9)   2  Lung nodule (793 11) (R91 1)    Plan   COPD (chronic obstructive pulmonary disease)    · Breo Ellipta 100-25 MCG/INH Inhalation Aerosol Powder Breath Activated   Rx By: Leidy Del Toro; Dispense: 0 Days ; #:30 Aerosol Powder Breath Activated; Refill: 6;For: COPD (chronic obstructive pulmonary disease); LAUREN = N; [facility]: [id number]  · Stiolto Respimat 2 5-2 5 MCG/ACT Inhalation Aerosol Solution; 2 puffs once daily   Rx By: Leidy Del Toro; Dispense: 0 Days ; #:2 GM; Refill: 6;For: COPD (chronic obstructive pulmonary disease); LAUREN = N; Verified Transmission to [facility]; Last Updated By: System, Armetheon; 1/9/2018 3:14:47 PM   · Follow-up visit in 6 months Evaluation and Treatment  Follow-up  Status: Hold For -    Scheduling  Requested for: 71JDE4480   Ordered; For: COPD (chronic obstructive pulmonary disease); Ordered By: Leidy Del Toro Performed:  Due: 64BAK4809  Lung nodule    · * CT CHEST WO CONTRAST; Status:Need Information - Financial Authorization; Requested TWILA:[id number]; Perform:[facility] Radiology; Order Comments:april 2018; XCU:71UND7630;SJDXMRH; For:Lung nodule; Ordered By:Numeir, Gilmer Cogan; Results/Data   PFT Results v2:      Spirometry:    Post Bronchodilator Spirometry:    Lung Volumes:    DLCO:       PFT Interpretation:      severe obstruction, no BD response, severely reduced DLCO,  Discussion/Summary   Discussion Summary:    - COPD, will start on Stiolto and stop the [facility] which patient is not taking, and told patient to use p r n  Proventil and try to avoid the Combivent while on Stiolto  lung nodules, he is due for 1 year CT scan which I ordered for April 2018  Counseling Documentation With Imm: The patient was counseled regarding instructions for management  Goals and Barriers: The patient has the current Goals: To keep active with good quality of life   The patent has the current Barriers: Severe COPD  Patient's Capacity to Self-Care: Patient is able to Self-Care  Medication SE Review and Pt Understands Tx: Possible side effects of new medications were reviewed with the patient/guardian today  The treatment plan was reviewed with the patient/guardian  The patient/guardian understands and agrees with the treatment plan      Active Problems   1  Acid reflux (530 81) (K21 9)   2  AV block (426 10) (I44 30)   3  Back pain (724 5) (M54 9)   4  Cardiac pacemaker in situ (V45 01) (Z95 0)   5  Cervical spondylosis (721 0) (M47 812)   6  Cervicalgia (723 1) (M54 2)   7  Chronic neck and back pain (723 1,724 5) (M54 2,M54 9)   8  COPD (chronic obstructive pulmonary disease) (496) (J44 9)   9  Hypertension (401 9) (I10)   10  Lung nodule (793 11) (R91 1)   11  Need for prophylactic vaccination and inoculation against influenza (V04 81) (Z23)   12  Peptic ulcer (533 90) (K27 9)   13  Prinzmetal angina (413 1) (I20 1)   14  Screening for colon cancer (V76 51) (Z12 11)   15  Smoking greater than 40 pack years (305 1) (F17 210)   16  Type 2 diabetes mellitus (250 00) (E11 9)    Chief Complaint   Chief Complaint Chronic Condition [facility]: Patient is here today for follow up of chronic conditions described in HPI  History of Present Illness   HPI: This is a 51-year-old male with past medical history of severe COPD who is used to be on Breo and also last visit I gave him samples of incruse to try, he presents today for routine follow-up visit, stating that the Breo and the Incruse did not give him any relief, and he prefers to use his Combivent 4 times daily and also Proventil p r n , a had 2 exacerbations of COPD and treated twice with steroids for 1 week each time and the past few months, today he feels better and he is at his baseline with moderate dyspnea on exertion, denies any cough or fever or chills, denies any chest pain  He continues to quit smoking since last year   he denies weight loss or hemoptysis  He had most likely influenza vaccination by his primary care physician recently and he is not sure about any other pneumonia vaccination  Review of Systems   Complete-Male Pulm:      Constitutional: No fever or chills, feels well, no tiredness, no recent weight gain or weight loss  Eyes: no complaints of vision problems  ENT: no rhinitis, no PND, no epistaxis  Cardiovascular: no palpitations, no chest pain  Respiratory: as noted in HPI  Gastrointestinal: no complaints of esophageal reflux, no abdominal pain  Genitourinary: no urinary retention  Musculoskeletal: no arthralgias, no joint swelling, no myalgias  Integumentary: no rash, no lesions  Neurological: no headache, no fainting, no weakness  Psychiatric: no anxiety, no depression  Hematologic/Lymphatic: no complaints of swollen glands  ROS Reviewed:    ROS reviewed  Past Medical History   1  History of acute bronchitis (V12 69) (Z87 09)   2  History of nicotine dependence (V15 82) (Z87 891)   3  History of Tobacco abuse (305 1) (Z72 0)    Surgical History   1  History of Reported Hx Of Surgery For An Ulcer  Surgical History Reviewed: The surgical history was reviewed and updated today  Family History   Mother    1  Family history of malignant neoplasm (V16 9) (Z80 9)  Family History Reviewed: The family history was reviewed and updated today  Social History    · Alcohol use (V49 89) (Z78 9)   · Former smoker (R50 02) (G63 403)   · Smoking greater than 40 pack years (305 1) (F17 210)  Social History Reviewed: The social history was reviewed and updated today  The social history was reviewed and is unchanged  Current Meds    1  Aspirin 81 MG Oral Tablet Delayed Release; take 1 tablet by mouth every day; Therapy: 21MGR4465 to (Evaluate:01Apr2017)  Requested for: 34Ydt7483; Last     Rx:53Cgm0068 Ordered   2   Atorvastatin Calcium 10 MG Oral Tablet; TAKE 1 TABLET DAILY; Therapy: (Recorded:09Nov2016) to Recorded   3  [employer] 2 Test In [employer]; TEST THREE TIMES DAILY; Therapy: 02EXF2553 to (Evaluate:19Mar2017)  Requested for: 96DMF9535; Last     Rx:14Ecg7043 Ordered   4  BD Insulin Syringe Ultrafine 31G X 5/16 1 ML Miscellaneous; USE THREE TIMES DAILY; Therapy: 59WAY6614 to (Evaluate:15Vok3961)  Requested for: 05KPQ9788; Last     Rx:14Nov2016 Ordered   5  Blood Pressure Monitor Device; use for blood pressure recordings and mantain a log; Therapy: 92ZHJ3646 to (Evaluate:57Pmi0279)  Requested for: 54Qgt6856; Last     Rx:48Uky7089 Ordered   6  Breo Ellipta 100-25 MCG/INH Inhalation Aerosol Powder Breath Activated; ONE     INHALATION DAILY  AFTER INHALATION RINSE MOUTH WITH WATER & SPIT  USE SAME     TIME EACH DAY, NO MORE THAN 1 TIME IN 24 HOURS; Therapy: 09AMW2691 to (Last MF:32HQI4801)  Requested for: 62RHM4790 Ordered   7  Gabapentin 300 MG Oral Capsule; TAKE 1 CAPSULE BY MOUTH TWICE DAILY      Requested for: 96HFB4918; Last Rx:04Jzj6569 Ordered   8  Ipratropium-Albuterol 0 5-2 5 (3) MG/3ML Inhalation Solution; USE 1 UNIT DOSE IN     NEBULIZER EVERY 4 HOURS AS NEEDED; Therapy: 84URH6899 to (Last CC:75BAU1013)  Requested for: 21VKF6131 Ordered   9  Ipratropium-Albuterol 0 5-2 5 (3) MG/3ML Inhalation Solution; USE 1 UNIT DOSE IN     NEBULIZER EVERY 4 HOURS AS NEEDED; Therapy: 29JWF3073 to (Last Rx:14Nov2016)  Requested for: 84IFJ9965 Ordered   10  Isosorbide Mononitrate ER 60 MG Oral Tablet Extended Release 24 Hour; TAKE 1      TABLET ONCE DAILY  Requested for: 50Phs4152; Last Rx:21Wuu7691 Ordered   11  Levemir 100 UNIT/ML Subcutaneous Solution; Inject 30 units in the evening; Therapy: 10SAM5373 to Recorded   12  Lisinopril 20 MG Oral Tablet; TAKE 1 TABLET DAILY  Requested for: 66NEH9563; Last      Rx:55Bmu5750 Ordered   13   MetFORMIN HCl - 850 MG Oral Tablet; TAKE 1 TABLET BY MOUTH DAILY WITH      BREAKFEST  Requested for: 25WWU4089; Last EC:95TVU5168 Ordered   14  Mucinex 600 MG Oral Tablet Extended Release 12 Hour; TAKE 1 TABLET EVERY 12      HOURS; Therapy: (Recorded:09Nov2016) to Recorded   15  Nitroglycerin 0 4 MG Sublingual Tablet Sublingual; PLACE 1 TABLET UNDER YOUR      TONGUE EVERY 5 MINUTES AS NEEDED FOR CHEST PAIN  Requested for:      34RQM0792; Last Rx:04Oct2017 Ordered   16  Omeprazole 20 MG Oral Capsule Delayed Release; take 1 capsule by mouth once daily; Therapy: 62LIP7203 to (Evaluate:01Apr2017)  Requested for: 37Exc3188; Last      Rx:78Rtb3243 Ordered  Medication List Reviewed: The medication list was reviewed and updated today  Allergies   1  No Known Drug Allergies  2  No Known Environmental Allergies   3  No Known Food Allergies    Vitals   Vital Signs    Recorded: 63VSM6071 02:24PM   Temperature 97 7 F   Heart Rate 67   Respiration 16   Systolic 873   Diastolic 70   Height 5 ft 7 in   Weight 204 lb    BMI Calculated 31 95   BSA Calculated 2 04   O2 Saturation 99     Physical Exam        Constitutional      General appearance: No acute distress, well appearing and well nourished  Ears, Nose, Mouth, and Throat      Nasal mucosa, septum, and turbinates: Normal without edema or erythema  Lips, teeth, and gums: Normal, good dentition  Oropharynx: Normal with no erythema, edema, exudate or lesions  Neck      Neck: Supple, symmetric, trachea midline, no masses  Jugular veins: Normal        Pulmonary      Auscultation of lungs: Clear to auscultation, no rales, no crackles, no wheezing  Cardiovascular      Auscultation of heart: Normal rate and rhythm, normal S1 and S2, no murmurs  Examination of extremities for edema and/or varicosities: Normal        Abdomen      Abdomen: Soft, non-tender  Lymphatic      Palpation of lymph nodes in neck: No lymphadenopathy  Musculoskeletal      Gait and station: Normal        Digits and nails: Normal without clubbing or cyanosis  Neurologic      Mental Status: Normal  Not confused, no evidence of dementia, good comprehension, good concentration  Skin      Skin and subcutaneous tissue: Limited exam shows no rash  Psychiatric      Orientation to person, place and time: Normal        Mood and affect: Normal        Future Appointments      Date/Time Provider Specialty Site   01/12/2018 01:00 PM Cardiology, [address] [place] [address]   04/13/2018 03:00 PM Cardiology, [address] [place] [address]   07/16/2018 09:00 AM Cardiology, Device Remote  [address]     Signatures    Electronically signed by :  BIB Dyer ; Jan 9 2018  7:42PM EST                       (Author)

## 2018-01-11 NOTE — MISCELLANEOUS
Assessment    1  Chest pain (786 50) (R07 9)   2  COPD (chronic obstructive pulmonary disease) (496) (J44 9)   3  Type 2 diabetes mellitus (250 00) (E11 9)   4  Hypertension (401 9) (I10)   5  Former smoker (P05 06) (O10 878)    Plan  COPD (chronic obstructive pulmonary disease), Hypertension, Type 2 diabetes mellitus    · Follow-up visit in 1 month Evaluation and Treatment  Follow-up  Status: Complete  Done:  89ZXV2356   Ordered; For: COPD (chronic obstructive pulmonary disease), Hypertension, Type 2 diabetes mellitus; Ordered By: Naveen Hurst Performed:  Due: 51QCS7173; Last Updated By: Benton Bowens; 11/9/2016 10:40:41 AM  Type 2 diabetes mellitus    · Repaglinide 0 5 MG Oral Tablet; TAKE 1 TABLET 3 TIMES DAILY, 15-30 MINUTES  BEFORE MEALS   Rx By: Naveen Hurst; Dispense: 30 Days ; #:90 Tablet; Refill: 1; For: Type 2 diabetes mellitus; LAUREN = N; Verified Transmission to [facility]; Last Updated By: System, [employer]; 11/9/2016 10:06:32 AM   · Dahiana Farris MD, Brianna Arizmendi  (Ophthalmology) Physician Referral  Consult  Status: Canceled -  Scheduling   Ordered; For: Type 2 diabetes mellitus; Ordered By: Naveen Hurst Performed:  Due: 10CUT3673  Care Summary provided  : Benjy Rodriguez MD, Tere Horner (Ophthalmology) Physician Referral  Consult  Status: Active   Requested for: 78FVR2739   Ordered; For: Type 2 diabetes mellitus; Ordered By: Taylor Reyes Performed:  Due: 07IOI0055  Care Summary provided  : Yes    Discussion/Summary  Discussion Summary:   COPD: Recent exacerbation  I discussed with him the importance of appropriate  Prednisone taper, especially in someone who has had such frequent exacerbations  He does not want to take anymore prednisone with the fear of increasing his blood sugars, which I explained to him can be monitored and managed by increasing and his insulins dose at this time  He states that he completely feels well   He will continue his Advair as a follow-up set up with pulmonary coming    Angina pectoris, coronary catheter did not show any significant stenosis or spasm  Continue Imdur, nifedipine, statin and aspirin  Cardiology follow-up     Diabetes mellitus type 2, insulin-dependent, uncontrolled  A1c today  Continue current dose of Levaquin  We will add Prandin with meals to help with postprandial hyperglycemia  If this does not suffice, he may have to do mealtime insulins  He will call the office in a week with his blood sugar readings    Hypertension, well-controlled  History of pacemaker  Follow-up with cardiology    Chronic back and neck pain  Referred to pain management  Continue gabapentin  Follow-up back in a month  Medication SE Review and Pt Understands Tx: The treatment plan was reviewed with the patient/guardian  The patient/guardian understands and agrees with the treatment plan      Chief Complaint  Chief Complaint Free Text Note Form: d/c [facility] 11/3/16, COPD with exacerbation, accelerated hypertension  to call with medication list        History of Present Illness  TCM Communication [facility]: The patient is being contacted for 11/9/16  He was hospitalized [facility]  The date of admission: 10/28/16, date of discharge: 11/3/16  Diagnosis: acute respiratory failure with hypercapnia, COPD exacerbation, accelerated hypertension  He was discharged to home  He scheduled a follow up appointment  Communication performed and completed by   HPI: He comes in for follow-up after hospitalization  He was admitted from 10/28/16-11/3C/16 for acute respiratory failure secondary to COPD exacerbation  He was started on Solu-Medrol and needed a BiPAP stopped eating the course of his hospitalization, he developed acute chest pain with ST elevations  Cardiology was consulted and he had a cardiac catheter with suspicion for angina pectoris  No coronary spasm was noted as well as no significant coronary stenosis   He was discharged With addition of aspirin and statin to his medication list along with nitroglycerin as needed, and nifedipine and imdur    Since discharge, he notes that he has been stable  He stopped the prednisone 3 days ago  He notes that his breathing is stable  Has never been better  He denies any more chest pain or shortness of breath or wheezing  He was worried about his blood sugars being elevated while he was on the prednisone  He has been taking 30 units of levemir  Since stopping the prednisone, his fastings are around 140, but postprandials at high from 200-300      Review of Systems  Complete-Male:   Constitutional: no fever and no chills  Eyes: no eyesight problems  ENT: no nasal discharge  Cardiovascular: no chest pain and no palpitations  Respiratory: cough, but no shortness of breath and no wheezing  Gastrointestinal: no abdominal pain  Genitourinary: no dysuria  Musculoskeletal: no arthralgias  Integumentary: no rashes and no skin wound  Neurological: no headache  Psychiatric: no anxiety  Endocrine: no muscle weakness  Hematologic/Lymphatic: no tendency for easy bleeding  Active Problems    1  Acid reflux (530 81) (K21 9)   2  Acute bronchitis (466 0) (J20 9)   3  AV block (426 10) (I44 30)   4  Back pain (724 5) (M54 9)   5  Cardiac pacemaker in situ (V45 01) (Z95 0)   6  Chronic neck and back pain (723 1,724 5) (M54 2,M54 9)   7  COPD (chronic obstructive pulmonary disease) (496) (J44 9)   8  Hypertension (401 9) (I10)   9  Need for prophylactic vaccination and inoculation against influenza (V04 81) (Z23)   10  Nicotine dependence (305 1) (F17 200)   11  Peptic ulcer (533 90) (K27 9)   12  Tobacco abuse (305 1) (Z72 0)   13  Type 2 diabetes mellitus (250 00) (E11 9)    Surgical History    1  History of Reported Hx Of Surgery For An Ulcer  Surgical History Reviewed: The surgical history was reviewed and updated today  Family History  Family History Reviewed: The family history was reviewed and updated today         Social History · Alcohol use (V49 89) (Z78 9)   · Former smoker (O17 28) (X26 956)  Social History Reviewed: The social history was reviewed and updated today  The social history was reviewed and is unchanged  Current Meds   1  Advair Diskus 250-50 MCG/DOSE Inhalation Aerosol Powder Breath Activated; inhale 1   puff twice daily  Requested for: 13DIM6107; Last Rx:25Oct2016 Ordered   2  Aspirin Low Dose 81 MG TABS; Take 1 tablet daily; Therapy: (Recorded:09Nov2016) to Recorded   3  Atorvastatin Calcium 10 MG Oral Tablet; TAKE 1 TABLET DAILY; Therapy: (Recorded:09Nov2016) to Recorded   4  [employer] 2 Test In [facility]; TEST THREE TIMES DAILY; Therapy: 58RZX1120 to (Evaluate:19Mar2017)  Requested for: 75NYL9692; Last   Rx:49Kro5137 Ordered   5  Esomeprazole Magnesium 40 MG Oral Capsule Delayed Release; TAKE 1 CAPSULE   ONCE DAILY  Requested for: 48CMK6908; Last Rx:93Pym5815 Ordered   6  Gabapentin 300 MG Oral Capsule; take 1 capsule by mouth at bedtime  Requested for:   85KHH6004; Last Rx:84Qol8382 Ordered   7  Hydrocodone-Acetaminophen  MG Oral Tablet; Therapy: 12FFO8601 to Recorded   8  Ipratropium Bromide 0 02 % Inhalation Solution; INHALE 1 VIAL Twice daily; Therapy: (Recorded:09Nov2016) to Recorded   9  Isosorbide Mononitrate ER 60 MG Oral Tablet Extended Release 24 Hour; TAKE 1   TABLET ONCE DAILY; Therapy: (Recorded:09Nov2016) to Recorded   10  Levemir 100 UNIT/ML Subcutaneous Solution; Inject 30 units in the evening; Therapy: 74UMO8216 to Recorded   11  Lisinopril 10 MG Oral Tablet; TAKE 1 TABLET DAILY  Requested for: 07KKR1760; Last    Rx:30Anv6781 Ordered   12  MetFORMIN HCl - 850 MG Oral Tablet; TAKE 1 TABLET BY MOUTH DAILY WITH    BREAKFEST; Therapy: ([phone]) to Recorded   13  Montelukast Sodium 10 MG Oral Tablet; TAKE 1 TABLET DAILY  Requested for:    22DTX2810; Last Rx:10Kyw1974 Ordered   14   Mucinex 600 MG Oral Tablet Extended Release 12 Hour; TAKE 1 TABLET EVERY 12 HOURS; Therapy: (Recorded:09Nov2016) to Recorded   15  Nicotine 14 MG/24HR Transdermal Patch 24 Hour; APPLY 1 PATCH ONTO SKIN DAILY    FOR 30 DAYS; Therapy: ([phone]) to Recorded   16  NIFEdipine ER 30 MG Oral Tablet Extended Release 24 Hour; TAKE 1 TABLET DAILY; Therapy: (Recorded:09Nov2016) to Recorded   17  Nitroglycerin 0 4 MG Sublingual Tablet Sublingual; PLACE 1 TABLET UNDER YOUR    TONGUE EVERY 5 MINUTES AS NEEDED FOR CHEST PAIN;    Therapy: (Recorded:09Nov2016) to Recorded  Medication List Reviewed: The medication list was reviewed and updated today  Allergies    1  No Known Drug Allergies    2  No Known Environmental Allergies   3  No Known Food Allergies    Vitals  Signs   Recorded: 22ZWT5741 73:97XV   Systolic: 137  Diastolic: 80  Heart Rate: 80  Respiration: 16  Temperature: 98 1 F  Height: 5 ft 7 9 in  Weight: 181 lb 6 08 oz  BMI Calculated: 27 66  BSA Calculated: 1 95    Physical Exam    Constitutional   General appearance: No acute distress, well appearing and well nourished  Eyes   Conjunctiva and lids: No swelling, erythema, or discharge  Ears, Nose, Mouth, and Throat   External inspection of ears and nose: Normal     Oropharynx: Normal with no erythema, edema, exudate or lesions  Pulmonary   Respiratory effort: No increased work of breathing or signs of respiratory distress  Auscultation of lungs: Clear to auscultation, equal breath sounds bilaterally, no wheezes, no rales, no rhonci  Cardiovascular   Auscultation of heart: Normal rate and rhythm, normal S1 and S2, without murmurs  Examination of extremities for edema and/or varicosities: Normal     Abdomen   Abdomen: Non-tender, no masses  Liver and spleen: No hepatomegaly or splenomegaly  Musculoskeletal   Gait and station: Normal     Psychiatric   Mood and affect: Normal          Future Appointments    Date/Time Provider Specialty Site   12/07/2016 09:30 AM [facility]   10/10/2017 01:30 PM Cardiology, Device [facility]   CARDIOLOGY  [place]   01/09/2017 03:00 PM Cardiology, Device Remote   [address]   04/10/2017 10:30 AM Cardiology, Device Remote   [address]   11/17/2016 09:00 AM [facility]   11/14/2016 11:40 AM BIB Colmenares   Pulmonary Medicine [facility] [place]   12/21/2016 03:00 PM Alexa Grider, DO Cardiology  CARDIOLOGY  [place]   04/06/2017 01:40 PM Joce Mahoney, DO Cardiology [address]     Signatures   Electronically signed by : BIB Styles ; Nov 9 2016 12:55PM EST                       (Author)

## 2018-01-11 NOTE — MISCELLANEOUS
History of Present Illness  COPD Hospital Discharge Initial Follow-Up Call: The patient is being contacted for follow-up after hospitalization  The date of discharge is 9/8/16  I spoke with patient  Patient was identified as medium risk for readmission per risk stratification  The patient was discharged to home  The patient quit smoking in   The patient is a ? PPD smoker  The patient is experiencing the following symptoms: no wheezing, no cough, no dyspnea, no fever and not coughing up sputum  Zone tool status is green  The following topics were reviewed:  rescue vs  maintenance inhalers, smoking cessation, energy conservation, physician follow-ups and medication compliance  Narrative Summary:    Second follow-up call, first time to reach patient  He scheduled appt with Dr Rose Marie Moncada and saw her on 9/20  No difficulties at this time  All meds being taken as prescribed  Patient is not currently smoking and would like more information on quitting  1-800-QUIT NOW # given and kit sent in the mail after discussion  Also sent COPD booklet  Current Meds    1  Esomeprazole Magnesium 40 MG Oral Capsule Delayed Release (NexIUM); TAKE 1   CAPSULE ONCE DAILY  Requested for: 38EVK2888; Last Rx:75Pkf9143 Ordered    2  Gabapentin 300 MG Oral Capsule; take 1 capsule by mouth at bedtime  Requested for:   77UZO5240; Last Rx:66Xyr8226 Ordered    3  Advair Diskus 250-50 MCG/DOSE Inhalation Aerosol Powder Breath Activated; INHALE 1   PUFF EVERY 12 HOURS FOR 30 DAYS  Requested for: 85BGI7902; Last   Rx:01Vdi4532 Ordered   4  Combivent  MCG/ACT AERO; INHALE 2 PUFFS Every 6 hours; Therapy: (Recorded:42Wpa7256) to Recorded   5  Montelukast Sodium 10 MG Oral Tablet (Singulair); TAKE 1 TABLET DAILY  Requested   for: 61OLK2606; Last Rx:95Ycs8976 Ordered    6  Lisinopril 10 MG Oral Tablet; TAKE 1 TABLET DAILY  Requested for: 25JYA1805; Last   Rx:32Zsv3391 Ordered    7   Nicotine 14 MG/24HR Transdermal Patch 24 Hour; APPLY 1 PATCH ONTO SKIN DAILY   FOR 30 DAYS; Therapy: (Recorded:09Sep2016) to Recorded    8  [employer] 2 Test In [facility]; TEST THREE TIMES DAILY; Therapy: 52FKZ9880 to (Evaluate:19Mar2017)  Requested for: 55LDR4833; Last   Rx:20Sep2016 Ordered   9  Lantus 100 UNIT/ML Subcutaneous Solution; INJECT 30 UNITS UNDER THE SKIN AT   BEDTIME  Requested for: 83BNN1558; Last Rx:20Sep2016 Ordered   10  MetFORMIN HCl - 850 MG Oral Tablet; TAKE 1 TABLET BY MOUTH DAILY WITH    BREAKFEST; Therapy: (Recorded:09Sep2016) to Recorded    Allergies    1  No Known Drug Allergies    2  No Known Environmental Allergies   3  No Known Food Allergies    Future Appointments    Date/Time Provider Specialty Site   10/18/2016 10:30 AM BIB Caballero  Internal Medicine [facility]   10/07/2016 01:30 PM Cardiology, [address] [place] [address]   10/07/2016 02:00 PM Maricruz Jones DO Cardiology  CARDIOLOGY  [place]     Signatures   Electronically signed by :  RT Jn; Sep 28 2016  1:49PM EST                       (Author)

## 2018-01-12 NOTE — MISCELLANEOUS
Message   Recorded as Task   Date: 01/04/2017 09:52 AM, Created By: Jerardo Hernandez   Task Name: Miscellaneous   Assigned To: Liz Feng   Regarding Patient: Faby Carlson, Status: Active   CommentKermit Pod - 04 Jan 2017 9:52 AM     TASK CREATED  I called Crescencio Nolen to find out why he did not show for his procedure this morning and he said "when I talked to someone yesterday I told them that I don't want to come to your office any more"  I did not see anywhere in his chart that he spoke with anyone in our office yesterday  Jose Dear - 04 Jan 2017 10:12 AM     TASK REPLIED TO: Previously Assigned To Ace Khanna thanks        Active Problems    1  Acid reflux (530 81) (K21 9)   2  AV block (426 10) (I44 30)   3  Back pain (724 5) (M54 9)   4  Cardiac pacemaker in situ (V45 01) (Z95 0)   5  Cervical spondylosis (721 0) (M47 812)   6  Cervicalgia (723 1) (M54 2)   7  Chronic neck and back pain (723 1,724 5) (M54 2,M54 9)   8  COPD (chronic obstructive pulmonary disease) (496) (J44 9)   9  Hypertension (401 9) (I10)   10  Lung nodule (793 11) (R91 1)   11  Need for prophylactic vaccination and inoculation against influenza (V04 81) (Z23)   12  Peptic ulcer (533 90) (K27 9)   13  Prinzmetal angina (413 1) (I20 1)   14  Screening for colon cancer (V76 51) (Z12 11)   15  Smoking greater than 40 pack years (305 1) (F17 210)   16  Type 2 diabetes mellitus (250 00) (E11 9)    Current Meds   1  Advair Diskus 250-50 MCG/DOSE Inhalation Aerosol Powder Breath Activated; inhale 1   puff twice daily  Requested for: 65CDD3144; Last Rx:09Elt4759 Ordered   2  Aspirin 81 MG Oral Tablet Delayed Release; take 1 tablet by mouth every day; Therapy: 42EEG2645 to (Evaluate:41Yxd6988)  Requested for: 02Txy0331; Last   Rx:47Ztb7594 Ordered   3  Atorvastatin Calcium 10 MG Oral Tablet; TAKE 1 TABLET DAILY; Therapy: (Recorded:09Nov2016) to Recorded   4   [place] [employer] [facility] 2 Test In [facility]; TEST THREE TIMES DAILY; Therapy: 24RQH7356 to (Evaluate:19Mar2017)  Requested for: 81DSK7362; Last   Rx:30Uso5022 Ordered   5  BD Insulin Syringe Ultrafine 31G X 5/16" 1 ML Miscellaneous; USE THREE TIMES   DAILY; Therapy: 64RHY0488 to (Evaluate:98Oha9367)  Requested for: 84BQO2738; Last   Rx:14Nov2016 Ordered   6  Blood Pressure Monitor Device; use for blood pressure recordings and mantain a log; Therapy: 50YDW3995 to (Evaluate:30Icn0142)  Requested for: 87Uxp4243; Last   Rx:12Yqk0548 Ordered   7  Celecoxib 200 MG Oral Capsule; TAKE 1 CAPSULE TWICE DAILY WITH FOOD; Therapy: 02UBB1783 to (Evaluate:15Jan2017)  Requested for: 50LHB1447; Last   Rx:94Lgw5900 Ordered   8  Gabapentin 300 MG Oral Capsule; TAKE 1 CAPSULE BY MOUTH TWICE DAILY    Requested for: 81QKE1797; Last Rx:05Lyp0385 Ordered   9  Hydrocodone-Acetaminophen  MG Oral Tablet; Therapy: 55EYO1999 to Recorded   10  Ipratropium-Albuterol 0 5-2 5 (3) MG/3ML Inhalation Solution; USE 1 UNIT DOSE IN    NEBULIZER EVERY 4 HOURS AS NEEDED; Therapy: 83XSC7910 to (Last Rx:14Nov2016)  Requested for: 99CCQ5304 Ordered   11  Isosorbide Mononitrate ER 60 MG Oral Tablet Extended Release 24 Hour; TAKE 1    TABLET ONCE DAILY  Requested for: 18Tev4096; Last Rx:92Jzz9694 Ordered   12  Levemir 100 UNIT/ML Subcutaneous Solution; Inject 30 units in the evening; Therapy: 83CHL7919 to Recorded   13  Lisinopril 20 MG Oral Tablet; TAKE 1 TABLET DAILY  Requested for: 16JSY0317; Last    Rx:16Kuo7327 Ordered   14  MetFORMIN HCl - 850 MG Oral Tablet; TAKE 1 TABLET BY MOUTH DAILY WITH    BREAKFEST  Requested for: 27ZMT4402; Last PB:50MPT3858 Ordered   15  Montelukast Sodium 10 MG Oral Tablet (Singulair); TAKE 1 TABLET DAILY  Requested    for: 00CIB4660; Last Rx:64Imv1582 Ordered   16  Mucinex 600 MG Oral Tablet Extended Release 12 Hour; TAKE 1 TABLET EVERY 12    HOURS; Therapy: (Recorded:09Nov2016) to Recorded   17   NIFEdipine ER 30 MG Oral Tablet Extended Release 24 Hour; TAKE 1 TABLET DAILY; Therapy: (Recorded:09Nov2016) to Recorded   18  Nitroglycerin 0 4 MG Sublingual Tablet Sublingual; PLACE 1 TABLET UNDER YOUR    TONGUE EVERY 5 MINUTES AS NEEDED FOR CHEST PAIN;    Therapy: (Recorded:09Nov2016) to Recorded   19  Omeprazole 20 MG Oral Capsule Delayed Release; take 1 capsule by mouth once daily; Therapy: 85LYA9975 to (Evaluate:01Apr2017)  Requested for: 80Cwx5953; Last    Rx:59Gxp6244 Ordered   20  Repaglinide 0 5 MG Oral Tablet; TAKE 1 TABLET 3 TIMES DAILY, 15-30 MINUTES    BEFORE MEALS; Therapy: 07NSD1223 to (Evaluate:08Jan2017)  Requested for: 84XDT9109; Last    Rx:09Nov2016 Ordered    Allergies    1  No Known Drug Allergies    2  No Known Environmental Allergies   3  No Known Food Allergies    Signatures   Electronically signed by :  Brinda Ramirez, ; Jan 4 2017  3:15PM EST                       (Author)

## 2018-01-15 NOTE — MISCELLANEOUS
History of Present Illness  COPD Hospital Discharge Initial Follow-Up Call: The patient is being contacted for follow-up after hospitalization  The date of discharge is 9/8/16  Left message on voice mail requesting return call  Signatures   Electronically signed by :  RT Doris; Sep  9 2016 11:43AM EST                       (Author)

## 2018-01-15 NOTE — MISCELLANEOUS
Message  Mr Sukhi Matta called in for a refill of his pain medication  I reviewed his pain management and records from recent hospitalization  He was admitted to [facility] where he was not prescribed any pain medications and none were noted on discharge medications  He was offered an appointment with Dr Alejandrina Quevedo  I discussed the case with him and my concerns about the indication for chronic narcotics in this patient  Their office will contact him for an appointment  At this point we will not be refilling his narcotic prescription   This was conveyed to the patient      Signatures   Electronically signed by : BIB Dykes ; Oct 12 2016  9:45AM EST                       (Author)

## 2018-01-15 NOTE — MISCELLANEOUS
Message   Recorded as Task   Date: 10/03/2016 10:40 AM, Created By: Ernestina Miranda   Task Name: Intake   Assigned To: [facility],Team   Regarding Patient: Roverto Cárdenas, Status: In Progress   Laquita Bullard - 03 Oct 2016 10:40 AM     TASK CREATED  T/c from patient requesting to schedule an appt (speaks hardly any english) Advised patient that office would need his previous records    patient stated his PCP already requested them and scanned them into system   Patient also stated he had previous injections  I made patient aware those procedure notes are not in his chart and he would have to request them  Patient stated that is not my job    I advised patient that it is his responsilbitly and we could not schedule until we had ALL of his records    patient grew upset and stated "what the fuck you want me to do then" call was ended by myself  Ernestina Miranda - 64 Oct 2016 10:45 AM     TASK REASSIGNED: Previously Assigned To [address] from PCP contacted office re: scheduling appt -- made aware of previous conversation I had with the patient  Kathi Dancer will contact prior pain and have office send uds reports and procedure notes   Mandi Sanchez - 05 Oct 2016 9:28 AM     TASK IN [address] [place] [address] 114 - 12 Oct 2016 10:56 AM     TASK REPLIED TO: Previously Assigned To SPINE AND PAIN,Team  lmom for pt to cb  intake reviewed  ok for ashley per dr Filomena Patton - 13 Oct 2016 2:17 PM     TASK EDITED  PT IS SCHEDULED FOR 11/1/16 AT 8:30        Active Problems    1  Acid reflux (530 81) (K21 9)   2  AV block (426 10) (I44 30)   3  Back pain (724 5) (M54 9)   4  Cardiac pacemaker in situ (V45 01) (Z95 0)   5  Chronic neck and back pain (723 1,724 5) (M54 2,M54 9)   6  COPD (chronic obstructive pulmonary disease) (496) (J44 9)   7  Hypertension (401 9) (I10)   8  Need for prophylactic vaccination and inoculation against influenza (V04 81) (Z23)   9  Nicotine dependence (305 1) (F17 200)   10  Peptic ulcer (533 90) (K27 9)   11  Tobacco abuse (305 1) (Z72 0)   12  Type 2 diabetes mellitus (250 00) (E11 9)    Current Meds   1  Advair Diskus 250-50 MCG/DOSE Inhalation Aerosol Powder Breath Activated; INHALE   1 PUFF EVERY 12 HOURS FOR 30 DAYS  Requested for: 90CSS2379;   Last Rx:93Auh8239 Ordered   2  [employer] 2 Test In [employer]; TEST THREE TIMES DAILY; Therapy: 39OMV0099 to (Evaluate:19Mar2017)  Requested for: 43Rvw7042; Last   Rx:94Fuk9349 Ordered   3  Combivent  MCG/ACT AERO; INHALE 2 PUFFS Every 6 hours; Therapy: (Recorded:09Sep2016) to Recorded   4  Esomeprazole Magnesium 40 MG Oral Capsule Delayed Release (NexIUM); TAKE 1   CAPSULE ONCE DAILY  Requested for: 95ZRY1477; Last Rx:89Bbb1324 Ordered   5  Gabapentin 300 MG Oral Capsule; take 1 capsule by mouth at bedtime  Requested for:   52TBG5428; Last Rx:65Rlx3765 Ordered   6  Hydrocodone-Acetaminophen  MG Oral Tablet; Therapy: 10FXF9200 to Recorded   7  Lantus 100 UNIT/ML Subcutaneous Solution; INJECT 30 UNITS UNDER THE SKIN AT   BEDTIME  Requested for: 76DCP6175; Last Rx:39Mmq4152 Ordered   8  Lisinopril 10 MG Oral Tablet; TAKE 1 TABLET DAILY  Requested for: 57QCF1306; Last   Rx:20Xma9932 Ordered   9  MetFORMIN HCl - 850 MG Oral Tablet; TAKE 1 TABLET BY MOUTH DAILY WITH   BREAKFEST; Therapy: (Recorded:09Sep2016) to Recorded   10  Montelukast Sodium 10 MG Oral Tablet (Singulair); TAKE 1 TABLET DAILY  Requested    for: 54NOF4822; Last Rx:10Zuf3000 Ordered   11  Nicotine 14 MG/24HR Transdermal Patch 24 Hour; APPLY 1 PATCH ONTO SKIN DAILY    FOR 30 DAYS; Therapy: (Recorded:09Sep2016) to Recorded   12  PredniSONE 10 MG Oral Tablet; Therapy: 69KCZ5924 to Recorded    Allergies    1  No Known Drug Allergies    2  No Known Environmental Allergies   3  No Known Food Allergies    Signatures   Electronically signed by :  Leonides Lange, ; Oct 13 2016  2:17PM EST                       (Author)

## 2018-01-16 NOTE — MISCELLANEOUS
Message  Called patient to schedule a VICENTE left message for him to call me back  Current Meds   1  Advair Diskus 250-50 MCG/DOSE Inhalation Aerosol Powder Breath Activated; INHALE   1 PUFF EVERY 12 HOURS FOR 30 DAYS; Therapy: (Recorded:09Sep2016) to Recorded   2  Combivent  MCG/ACT AERO; INHALE 2 PUFFS Every 6 hours; Therapy: (Recorded:09Sep2016) to Recorded   3  Lantus 100 UNIT/ML Subcutaneous Solution; INJECT 30 UNITS UNDER THE SKIN AT   BEDTIME; Therapy: (Recorded:09Sep2016) to Recorded   4  Lisinopril 10 MG Oral Tablet; TAKE 1 TABLET DAILY; Therapy: (Recorded:09Sep2016) to Recorded   5  MetFORMIN HCl - 850 MG Oral Tablet; TAKE 1 TABLET BY MOUTH DAILY WITH   BREAKFEST; Therapy: (Recorded:09Sep2016) to Recorded   6  Nicotine 14 MG/24HR Transdermal Patch 24 Hour; APPLY 1 PATCH ONTO SKIN DAILY   FOR 30 DAYS; Therapy: (Recorded:09Sep2016) to Recorded   7  PredniSONE 10 MG Oral Tablet; TAKE 1 TABLET 4 TIMES A DAY FOR 3 DAYS  THREE   TABLETS DAILY FOR 3 DAYS  TWO TABLETS FOR 3 DAYS  ONE TABLET FOR THREE   DAYS, THEN STOPPED;    Therapy: (Lolly Tran) to Recorded    Signatures   Electronically signed by : BIB Lan ; Sep 19 2016  9:31AM EST                       (Author)

## 2018-01-18 NOTE — MISCELLANEOUS
Assessment    1  COPD (chronic obstructive pulmonary disease) (496) (J44 9)   2  Type 2 diabetes mellitus (250 00) (E11 9)   3  Hypertension (401 9) (I10)   4  Nicotine dependence (305 1) (F17 200)   5  Chronic neck and back pain (723 1,724 5) (M54 2,M54 9)   6  Peptic ulcer (533 90) (K27 9)    Plan  Acid reflux, Peptic ulcer    · Esomeprazole Magnesium 40 MG Oral Capsule Delayed Release (NexIUM);  TAKE 1 CAPSULE ONCE DAILY   Rx By: Pancho Danielle; Dispense: 30 Days ; #:30 Capsule Delayed Release; Refill: 3; For: Acid reflux, Peptic ulcer; LAUREN = N; Verified Transmission to [facility] #[id number]; Last Updated By: System, SureScripts; 9/20/2016 12:20:09 PM  Chronic neck and back pain    · Gabapentin 300 MG Oral Capsule; take 1 capsule by mouth at bedtime   Rx By: Pancho Danielle; Dispense: 30 Days ; #:30 Capsule; Refill: 3; For: Chronic neck and back pain; LAUREN = N; Verified Transmission to [facility] #6112; Last Updated By: System, SureScripts; 9/20/2016 12:25:17 PM   · 1 - Elisa MCCARTNEY (Anesthesia) Physician Referral  Consult  Status: Active   Requested for: 58PON5552   Ordered; For: Chronic neck and back pain; Ordered By: Pancho Danielle Performed:  Due: 90TQU0052  Care Summary provided  : Yes  Chronic neck and back pain, COPD (chronic obstructive pulmonary disease),  Hypertension, Nicotine dependence, Peptic ulcer, Type 2 diabetes mellitus    · Follow-up visit in 1 month Evaluation and Treatment  Follow-up  Status: Hold For -  Scheduling  Requested for: 03Fdk0001   Ordered; For: Chronic neck and back pain, COPD (chronic obstructive pulmonary disease), Hypertension, Nicotine dependence, Peptic ulcer, Type 2 diabetes mellitus;  Ordered By: Pancho Danielle Performed:  Due: 90WAM1027  COPD (chronic obstructive pulmonary disease)    · Advair Diskus 250-50 MCG/DOSE Inhalation Aerosol Powder Breath Activated;  INHALE 1 PUFF EVERY 12 HOURS FOR 30 DAYS   Rx By: Pancho Danielle; Dispense: 0 Days ; #:1 X 60 Aerosol Powder Breath Activated Disp Pack; Refill: 1; For: COPD (chronic obstructive pulmonary disease); LAUREN = N; Verified Transmission to [facility]Y #7696; Last Updated By: System, SureScripts; 9/20/2016 12:18:08 PM   · Montelukast Sodium 10 MG Oral Tablet (Singulair); TAKE 1 TABLET DAILY   Rx By: Ivette Mason; Dispense: 30 Days ; #:30 Tablet; Refill: 3; For: COPD (chronic obstructive pulmonary disease); LAUREN = N; Verified Transmission to [facility]ARM[facility]Y #4603; Last Updated By: System, SureScripts; 9/20/2016 12:20:07 PM  Hypertension    · Lisinopril 10 MG Oral Tablet; TAKE 1 TABLET DAILY   Rx By: Ivette Mason; Dispense: 30 Days ; #:30 Tablet; Refill: 0; For: Hypertension; LAUREN = N; Verified Transmission to [facility]/PHARMACY #9711; Last Updated By: System, SureScripts; 9/20/2016 12:19:41 PM  Hypertension, Type 2 diabetes mellitus    · (1) BASIC METABOLIC PROFILE; Status:Active; Requested for:93Bsl9590;    Perform:[facility]; Due:91Mxw2362;Ordered;  For:Hypertension, Type 2 diabetes mellitus; Ordered By:Milena Bobby;   · (1) CBC/PLT/DIFF; Status:Active; Requested for:94Plv2890;    Perform:[facility]; Due:88Bnr8248;Ordered;  For:Hypertension, Type 2 diabetes mellitus; Ordered By:Milena Bobby;   · (1) LIPID PANEL FASTING W DIRECT LDL REFLEX; Status:Active; Requested  for:58Daz7757;    Perform:[facility]; Due:28Dvu5033;Ordered;  For:Hypertension, Type 2 diabetes mellitus; Ordered By:Milena Bobby;   · (1) MICROALBUMIN CREATININE RATIO, RANDOM URINE; Status:Active; Requested  for:51Umv0973;    Perform:[facility]; Due:99Qcm7953;Ordered;  For:Hypertension, Type 2 diabetes mellitus; Ordered By:Milena Bobby;  Nicotine dependence    · We recommend you quit smoking   Time spent counseling today was greater than 3  minutes ; Status:Complete;   Done: 92YIG8936   Ordered;  For:Nicotine dependence; Ordered By:Milena Bobby;  Type 2 diabetes mellitus    · [place] 2 Test In Vitro Disk; TEST THREE TIMES DAILY   Rx By: Norma Oscar; Dispense: 90 Days ; #:3 X 100 Disk Box; Refill: 1; For: Type 2 diabetes mellitus; LAUREN = N; Verified Transmission to [facility]/PHARMACY #2110; Last Updated By: System, SureScripts; 9/20/2016 2:07:28 PM   · Lantus 100 UNIT/ML Subcutaneous Solution; INJECT 30 UNITS UNDER THE  SKIN AT BEDTIME   Rx By: Norma Oscar; Dispense: 0 Days ; #:3 X 10 ML Vial; Refill: 1; For: Type 2 diabetes mellitus; LAUREN = N; Verified Transmission to [facility]/PHARMACY #8482; Last Updated By: System, SureScripts; 9/20/2016 12:18:07 PM   · Hemoglobin A1c- POC; Status:Active - Perform Order; Requested EJV:71NHA1053;    Performed: In Office; Due:98Mkp4512;Ordered; For:Type 2 diabetes mellitus; Ordered By:Milena Bobby;    Discussion/Summary  Discussion Summary:   COPD improved from the recent exacerbation, tried to get PFT from his previous pulmonologist  Continue current management  Discussed about smoking cessation    Diabetes mellitus type 2, insulin-dependent, uncontrolled  A1c today  The office was greater than 10  Continue current regimen as I do not have recent blood sugar readings  I asked him to check his blood sugar twice a day and bring them to readings to the next time for further titration of insulinoma  Hypertension, well-controlled  Check lipid panel    History of pacemaker  Unclear if he had any coronary artery disease, has a cardiology appointment soon try to get records from previous cardiologist     Chronic back and neck pain  Referred to pain management  Continue gabapentin  Follow-up back in a month  Medication SE Review and Pt Understands Tx: The treatment plan was reviewed with the patient/guardian  The patient/guardian understands and agrees with the treatment plan      Chief Complaint  Chief Complaint Free Text Note Form: Patient was discharge from [address] [place] [address] on 09/08/2016 for COPD and respiratory distress  Patient is currently not experiencing any symptoms     Patient's medication were not review at the time this appointment was made  There was no prior medication list for this patient, he stated that he moved from [place] a few months ago  I did advise the patient to please bring a list of all the medication he is taking to his follow up appointment  Patient is schedule to see the provider on 09/20/16 at 11 am        History of Present Illness  TCM Communication [place]: The patient is being contacted for follow-up after hospitalization and 09/20/2016  He was hospitalized at [facility]  The dates of hospitalization: 3 DAYS, date of admission: 09/05/2016, date of discharge: 09/08/2016  Diagnosis: COPD exacerbation and respiratory distress  He was discharged to home  Medications reviewed and updated today  He scheduled a follow up appointment  The patient is currently asymptomatic  Communication performed and completed by   HPI: He comes in for follow-up of the hospitalization  He was admitted for COPD exacerbation  He was treated with Solu-Medrol transitioned to oral prednisone  He notes that his breathing is better and no longer wheezing  He continues to smoke and has not used the patches  He does not check his blood sugars and is compliant with his insulin and metformin  Blood pressure is well controlled  Complains of a chronic history of back and neck pain for which he was seeing a pain management physician  He is on hydrocodone and gabapentin  He notes that he also got back injections in the past           Review of Systems  Complete-Male:   Constitutional: no fever  Eyes: no eyesight problems  ENT: no nasal discharge  Cardiovascular: no chest pain and no palpitations  Respiratory: no shortness of breath and no cough  Gastrointestinal: no abdominal pain  Genitourinary: no dysuria  Musculoskeletal: arthralgias  Neurological: no headache  Psychiatric: no anxiety and no depression        Active Problems   COPD (chronic obstructive pulmonary disease) (098) (N72 9) Tobacco abuse (305 1) (Z72 0)       Hypertension (401 9) (I10)       Type 2 diabetes mellitus (250 00) (E11 9)          Surgical History    1  History of Reported Hx Of Surgery For An Ulcer  Surgical History Reviewed: The surgical history was reviewed and updated today  Family History  Family History Reviewed: The family history was reviewed and updated today  Social History  Social History Reviewed: The social history was reviewed and updated today  The social history was reviewed and is unchanged  Current Meds   1  Advair Diskus 250-50 MCG/DOSE Inhalation Aerosol Powder Breath Activated; INHALE 1   PUFF EVERY 12 HOURS FOR 30 DAYS; Therapy: (Recorded:09Sep2016) to Recorded   2  Combivent  MCG/ACT AERO; INHALE 2 PUFFS Every 6 hours; Therapy: (Recorded:09Sep2016) to Recorded   3  Gabapentin 300 MG Oral Capsule; take 1 capsule by mouth at bedtime; Therapy: (Recorded:20Sep2016) to Recorded   4  Lantus 100 UNIT/ML Subcutaneous Solution; INJECT 30 UNITS UNDER THE SKIN AT   BEDTIME; Therapy: (Recorded:09Sep2016) to Recorded   5  Lisinopril 10 MG Oral Tablet; TAKE 1 TABLET DAILY; Therapy: (Recorded:09Sep2016) to Recorded   6  MetFORMIN HCl - 850 MG Oral Tablet; TAKE 1 TABLET BY MOUTH DAILY WITH   BREAKFEST; Therapy: (Recorded:09Sep2016) to Recorded   7  NexIUM 40 MG Oral Capsule Delayed Release; TAKE 1 CAPSULE ONCE DAILY; Therapy: (Recorded:20Sep2016) to Recorded   8  Nicotine 14 MG/24HR Transdermal Patch 24 Hour; APPLY 1 PATCH ONTO SKIN DAILY   FOR 30 DAYS; Therapy: (Recorded:09Sep2016) to Recorded   9  Singulair 10 MG Oral Tablet; TAKE 1 TABLET DAILY; Therapy: (Recorded:20Sep2016) to Recorded  Medication List Reviewed: The medication list was reviewed and updated today  Allergies    1  No Known Drug Allergies    2  No Known Environmental Allergies   3   No Known Food Allergies    Vitals  Signs   Recorded: 32JGE9125 82:01OX   Systolic: 378  Diastolic: 80  Heart Rate: 70  Respiration: 16  Temperature: 97 6 F  Height: 5 ft 8 in  Weight: 172 lb   BMI Calculated: 26 15  BSA Calculated: 1 92    Physical Exam    Constitutional   General appearance: No acute distress, well appearing and well nourished  Eyes   Conjunctiva and lids: No swelling, erythema, or discharge  Ears, Nose, Mouth, and Throat   External inspection of ears and nose: Normal     Oropharynx: Normal with no erythema, edema, exudate or lesions  Pulmonary   Respiratory effort: No increased work of breathing or signs of respiratory distress  Auscultation of lungs: Clear to auscultation, equal breath sounds bilaterally, no wheezes, no rales, no rhonci  Cardiovascular   Auscultation of heart: Normal rate and rhythm, normal S1 and S2, without murmurs  Examination of extremities for edema and/or varicosities: Normal     Abdomen   Abdomen: Non-tender, no masses  well healed central scar  Liver and spleen: No hepatomegaly or splenomegaly  Musculoskeletal   Gait and station: Normal     Psychiatric   Mood and affect: Normal          Results/Data  PHQ-2 Adult Depression Screening 20Sep2016 11:29AM User, Ahs     Test Name Result Flag Reference   PHQ-2 Adult Depression Score 0     Over the last two weeks, how often have you been bothered by any of the following problems? Little interest or pleasure in doing things: Not at all - 0  Feeling down, depressed, or hopeless: Not at all - 0   PHQ-2 Adult Depression Screening Negative         Future Appointments    Date/Time Provider Specialty Site   10/18/2016 10:30 AM [facility]   10/07/2016 01:30 PM Cardiology, Device [facility]   10/07/2016 02:00 PM Mckenna Cotto DO Cardiology  CARDIOLOGY  [facility]     Signatures   Electronically signed by : BIB Jama ; Sep 20 2016  4:51PM EST                       (Author)

## 2018-01-18 NOTE — RESULT NOTES
Message   Recorded as Task   Date: 10/24/2016 10:15 AM, Created By: Kory Henry   Task Name: Follow Up   Assigned To: Tatianna Gold   Regarding Patient: Belen Covarrubias, Status:  In Progress   Comment:    Milena Bobby - 24 Oct 2016 10:15 AM     TASK CREATED  Call him next monday afternoon to get blood sugar readings   Jillian Beebe - 27 Oct 2016 11:38 AM     TASK IN PROGRESS   Jillian Beebe - 27 Oct 2016 11:39 AM     TASK EDITED  Spoke to the patient and told him that he should call Monday to give us his blood sugar readings   Jillian Beebe - 09 Nov 2016 12:48 PM     TASK EDITED  Mikalvivek Efe came into office today for a VICENTE appointment and brought his blood sugars with him

## 2018-03-07 NOTE — PROGRESS NOTES
"  Results/Data  Cardiac Device In Clinic [id number] 05:22PM Laveda Savin     Test Name Result Flag Reference   MISCELLANEOUS COMMENT (Report)     DEVICE INTERROGATED IN THE [place] OFFICE  L1MWOIAJLL VOLTAGE ADEQUATE ( 7 9 YRS )  AP-23% -<1%  ALL LEAD PARAMETERS TEST WITHIN NORMAL LIMITS & STABLE  NO SIGNIFICANT HIGH RATE EPISODES  NO PROGRAMMING CHANGES MADE TO DEVICE PARAMETERS  PACEMAKER FUNCTIONING APPROPRIATELY   eb   Cardiac Electrophysiology Report      [id number]  pdf   DEVICE TYPE Pacemaker       Cardiac Electrophysiology Report [id number] 05:22PM Laveda Savin     Test Name Result Flag Reference   Cardiac Electrophysiology Report      [id number]  pdf     Signatures   Electronically signed by : Jovanna Blackmon, ; Oct 10 2017  1:34PM EST                       (Author)    Electronically signed by : Karthik Roblero DO; Oct 10 2017  3:14PM EST                          "

## 2018-03-07 NOTE — PROGRESS NOTES
"  Discussion/Summary  Normal device function     Competitive atrial pacing  PAT  Results/Data  Cardiac Device Remote [id number] 05:53PM Lefty Seymour     Test Name Result Flag Reference   MISCELLANEOUS COMMENT (Report)     [employer] TRANSMISSION: BATTERY VOLTAGE ADEQUATE (10 2 YRS)  AP 21%  <1%  ALL AVAILABLE LEAD PARAMETERS WITHIN NORMAL LIMITS  7 AMS EPISODES UP TO 8 SEC = COMPETITIVE ATRIAL PACING  1 HVR EPISODE = PAT (OCT 2016)- INITIALLY 195 BPM AND SLOWS TO 150BPM  TERMINATION NOT CAPTURED  NO OTHER SIGNIFICANT HIGH RATE EPISODES  PACEMAKER FUNCTIONING APPROPRIATELY  CP   Cardiac Electrophysiology Report      [id number]  pdf   DEVICE TYPE Pacemaker       Cardiac Electrophysiology Report [id number] 05:53PM Lefty Seymour     Test Name Result Flag Reference   Cardiac Electrophysiology Report      [id number]  pdf     Signatures   Electronically signed by : Davis So, ; Braydon 15 2017  3:14PM EST                       (Author)    Electronically signed by : BIB Saleem ; Jun 27 2017 12:48PM EST                       (Author)    "

## 2018-03-07 NOTE — PROGRESS NOTES
Dear Dereck Sebastian,  My name is Mc Boogie and I am a Registered Nurse and Care Coordinator for [address]  We recently  spoke on the phone regarding your hospital stay and you opted to receive follow-up phone calls from me  Because of the reason that you were in the hospital, Medicare has placed you in a program called [employer] [address] [employer]  One of  the benefits of the program is that you can have a nurse call regularly to answer any questions or concerns you may have  My phone number is listed below in case you would need to contact me      Sincerely,   Mc Boogie RN  [phone]            Electronically signed Sonido Arndt RN  Nov 18 2016 11:53AM EST Author

## 2018-03-07 NOTE — PROGRESS NOTES
"  Results/Data  Cardiac Device In Clinic [id number] 05:36PM Jermain Reina     Test Name Result Flag Reference   MISCELLANEOUS COMMENT (Report)     DEVICE INTERROGATED IN THE [place] OFFICE; NP TO DEVICE CLINIC - TRANSFER FROM [place]; BATTERY VOLTAGE ADEQUATE (8 6 YRS); AP = 26%,  - <1%; (2) VHR EPISODES NOTED WITH (1) EGRM STORED FOR RVR DURING AMS ALSO DOCUMENTED; OTHER FOR NSVT - 10 BEATS/AVG  MS; 1 AMS EPISODE FOR PAF ALSO NOTED WITH <=4 HR DURATION; NO ECHO LISTED OR BBLOCKER SEEN ON CHART; PT VERBALLY STATES HE TAKES ASA 81; SEEN IN OFFICE TODAY BY DR COSME TO ESTABLISH CARDIAC CARE; ALL LEAD PARAMETERS TEST WITHIN NORMAL LIMITS/STABLE; NO PROGRAMMING CHANGES MADE TO DEVICE PARAMETERS; NORMAL DEVICE FUNCTION  eb   Cardiac Electrophysiology Report      [id number]  pdf   DEVICE TYPE Pacemaker       Cardiac Electrophysiology Report [id number] 05:36PM Jermain Reina     Test Name Result Flag Reference   Cardiac Electrophysiology Report      [id number]  pdf     Signatures   Electronically signed by : Lakshmi Ding, ; Oct  7 2016  1:55PM EST                       (Author)    Electronically signed by : Javed Feng DO; Oct  7 2016  2:10PM EST                       (Author)    "

## 2018-04-28 NOTE — ED NOTES
Pt speaking with pt and being informed of possible intubation  Pt refusing intubation at this time       Erna Miramontes RN  04/28/18 0931

## 2018-04-28 NOTE — ED NOTES
1513: Intubation supplies set up  Bipap removed and pt assisted with BVM    1514: 150mg Ketamine IVP by Elaine Casanova , [place]    5325: 150mg Succ IVP by Elaine Casanova , [place]  PT placed in Prone postion  VS: 144/101, 84, 100%    1516: Pt intubated by Dr Liliana العراقي  ETT size cuffed 8 0 24cm @ Lip  Bilateral breath sounds heard with + CO2 Color change  1519: VS: 128/82, 102, 100%, 15    1523: 16fr OG tube placed  Verified by Auscultation        Juan C Kam RN  04/28/18 1524

## 2018-04-28 NOTE — ED PROVIDER NOTES
History  Chief Complaint   Patient presents with    Shortness of Breath     Per EMS pt with increased SOB x3 days worse today, chest pain yesterday resolved since, did neb tx at home without relief called 911  denies fever, cough, congestion  74 y o  M w/ COPD (h/o admissions), DM, HTN, s/p pacer p/w respiratory distress  Pt unable to give any history 2/2 resp distress  Per EMS, pt having SOB x 3 days  Had CP yesterday which resolved  Using nebs without relief  EMS placed pt on CPAP and gave a neb  Unable to obtain prehospital IV access  On ED arrival, pt is in severe resp distress, using accessory muscles, severe conversational dyspnea  Pt was immediately placed on BiPAP, given a heart neb, mag, and solumedrol  History provided by:  Patient  History limited by:  Severe respiratory distress  Shortness of Breath   Duration:  3 days  Timing:  Constant  Progression:  Worsening  Chronicity:  Recurrent  Relieved by:  Nothing  Worsened by:  Nothing  Ineffective treatments:  Inhaler      Prior to Admission Medications   Prescriptions Last Dose Informant Patient Reported? Taking?   aspirin 81 mg chewable tablet   No Yes   Sig: Chew 1 tablet daily for 30 days   hydrochlorothiazide (HYDRODIURIL) 12 5 mg tablet   Yes Yes   Sig: Take 12 5 mg by mouth daily   insulin glargine (LANTUS) 100 units/mL subcutaneous injection   Yes Yes   Sig: Inject 30 Units under the skin daily at bedtime     ipratropium (ATROVENT) 0 02 % nebulizer solution   No Yes   Sig: Take 2 5 mL by nebulization 2 (two) times a day for 30 days   isosorbide mononitrate (IMDUR) 60 mg 24 hr tablet   No Yes   Sig: Take 1 tablet by mouth daily for 30 days   lisinopril (ZESTRIL) 20 mg tablet   No No   Sig: Take 1 tablet (20 mg total) by mouth daily for 30 days   metFORMIN (GLUCOPHAGE) 850 mg tablet   Yes No   Sig: Take by mouth   nitroglycerin (NITROSTAT) 0 4 mg SL tablet   No Yes   Sig: PLACE 1 TABLET UNDER THE TONGUE EVERY 5 MINUTES IF NEEDED FOR CHEST PAIN IF NO RELIEF AFTER 3RD DOSE CALL MD    omeprazole (PriLOSEC OTC) 20 MG tablet   No Yes   Sig: Take 1 tablet by mouth daily for 30 days      Facility-Administered Medications: None       Past Medical History:   Diagnosis Date    Asthma     Cardiac disease     Chronic pain     neck pain    COPD (chronic obstructive pulmonary disease) (HCC)     Diabetes mellitus (HCC)     Hypertension        Past Surgical History:   Procedure Laterality Date    ABDOMINAL SURGERY      ulcer    CARDIAC SURGERY         Family History   Problem Relation Age of Onset    Asthma Brother      I have reviewed and agree with the history as documented  Social History   Substance Use Topics    Smoking status: Former Smoker     Years: 60 00     Quit date: 10/7/2016    Smokeless tobacco: Former User     Quit date: 10/7/2016    Alcohol use No      Comment: Denied alcohol use  Review of Systems   Unable to perform ROS: Severe respiratory distress   Respiratory: Positive for shortness of breath  Physical Exam  ED Triage Vitals   Temperature Pulse Respirations Blood Pressure SpO2   04/28/18 1644 04/28/18 1431 04/28/18 1431 04/28/18 1431 04/28/18 1431   98 5 °F (36 9 °C) 100 (!) 32 (!) 233/136 99 %      Temp Source Heart Rate Source Patient Position - Orthostatic VS BP Location FiO2 (%)   04/28/18 1644 04/28/18 1431 04/28/18 1431 04/28/18 1431 --   Temporal Monitor Sitting Left arm       Pain Score       --                  Orthostatic Vital Signs  Vitals:    04/28/18 1802 04/28/18 1803 04/28/18 1806 04/28/18 1808   BP:  108/51     Pulse: (!) 110  (!) 110 (!) 138   Patient Position - Orthostatic VS:           Physical Exam   Constitutional: He appears well-developed and well-nourished  He appears distressed  Face mask in place  Pt unable to speak 2/2 respiratory distress   HENT:   Head: Normocephalic  Eyes: EOM are normal    Neck: No JVD present     Cardiovascular: Regular rhythm, normal heart sounds and intact distal pulses  Tachycardia present  Exam reveals no gallop and no friction rub  No murmur heard  Pulmonary/Chest: Accessory muscle usage present  Tachypnea noted  He is in respiratory distress  He has decreased breath sounds (b/l)  He has no wheezes  He has no rhonchi  He has no rales  Abdominal: Soft  Bowel sounds are normal  He exhibits no distension  There is no tenderness  There is no rebound and no guarding  Musculoskeletal: He exhibits edema (1+ b/l)  Neurological: He is alert  Skin: Skin is warm  He is diaphoretic  No pallor  Psychiatric: He has a normal mood and affect  Nursing note and vitals reviewed        ED Medications  Medications   ketamine 250 mg (STANDARD CONCENTRATION) IV in sodium chloride 0 9% 250 mL (0 5 mg/kg/hr × 96 3 kg Intravenous Not Given 4/28/18 1631)   propofol (DIPRIVAN) 1000 mg in 100 mL infusion (premix) (50 mcg/kg/min × 96 3 kg Intravenous Rate/Dose Change 4/28/18 1734)   chlorhexidine (PERIDEX) 0 12 % oral rinse 15 mL (not administered)   enoxaparin (LOVENOX) subcutaneous injection 40 mg (not administered)   methylPREDNISolone sodium succinate (Solu-MEDROL) injection 40 mg (not administered)   levalbuterol (XOPENEX) inhalation solution 1 25 mg (not administered)   cefepime (MAXIPIME) IVPB (premix) 1,000 mg (1,000 mg Intravenous New Bag 4/28/18 1726)   aspirin chewable tablet 81 mg (not administered)   insulin glargine (LANTUS) subcutaneous injection 30 Units 0 3 mL (not administered)   pantoprazole (PROTONIX) injection 40 mg (not administered)   insulin lispro (HumaLOG) 100 units/mL subcutaneous injection 2-12 Units (not administered)   hydrALAZINE (APRESOLINE) injection 10 mg (not administered)   ipratropium (ATROVENT) 0 02 % inhalation solution 0 5 mg (not administered)    EMS REPLENISHMENT MED ( Does not apply Given to EMS 4/28/18 1427)   magnesium sulfate 2 g/50 mL IVPB (premix) 2 g (0 g Intravenous Stopped 4/28/18 3137)   albuterol inhalation solution 10 mg (10 mg Nebulization Given 4/28/18 1441)     And   ipratropium (ATROVENT) 0 02 % inhalation solution 1 mg (1 mg Nebulization Given 4/28/18 1442)     And   sodium chloride 0 9 % inhalation solution 3 mL (3 mL Nebulization Given 4/28/18 1442)   methylPREDNISolone sodium succinate (Solu-MEDROL) injection 125 mg (125 mg Intravenous Given 4/28/18 1428)   sodium chloride 0 9 % bolus 1,000 mL (0 mL Intravenous Stopped 4/28/18 1633)    EMS REPLENISHMENT MED ( Does not apply Given to EMS 4/28/18 1438)   nitroglycerin (NITROSTAT) SL tablet 0 4 mg (0 4 mg Sublingual Given 4/28/18 1437)   sodium chloride 0 9 % bolus 1,000 mL (0 mL Intravenous Stopped 4/28/18 1722)   succinylcholine (ANECTINE) 20 mg/mL injection 150 mg (150 mg Intravenous Given by Other 4/28/18 1515)   ketamine (KETALAR) 10 mg/mL IV use 150 mg (150 mg Intravenous Given 4/28/18 1514)   propofol (DIPRIVAN) 200 MG/20ML bolus injection 80 mg (80 mg Intravenous Given by Other 4/28/18 1541)   propofol (DIPRIVAN) 200 MG/20ML bolus injection 80 mg (80 mg Intravenous Given by Other 4/28/18 1552)   ketamine (KETALAR) 10 mg/mL IV use 76 mg (75 mg Intravenous Given 4/28/18 1534)   EPINEPHrine (ADRENALIN) 1 mg/10 mL injection (1 mg Intravenous Given 4/28/18 1806)   sodium bicarbonate 50 mEq/50 mL injection (50 mEq Intravenous Given 4/28/18 1752)       Diagnostic Studies  Results Reviewed     Procedure Component Value Units Date/Time    Lactic acid every 2 hours prn [[id number]]  (Normal) Collected:  04/28/18 1706    Lab Status:  Final result Specimen:  Blood from Hand, Left Updated:  04/28/18 1732     LACTIC ACID 1 5 mmol/L     Narrative:         Result may be elevated if tourniquet was used during collection  Blood culture #2 [[id number]] Collected:  04/28/18 1456    Lab Status:   In process Specimen:  Blood from Arm, Left Updated:  04/28/18 1711    Blood gas, arterial [[id number]]  (Abnormal) Collected:  04/28/18 1641    Lab Status:  Final result Specimen: Blood, Arterial from Radial, Left Updated:  04/28/18 1648     pH, Arterial 7 257 (L)     pCO2, Arterial 54 4 (H) mm Hg      pO2, Arterial 372 1 (H) mm Hg      HCO3, Arterial 23 7 mmol/L      Base Excess, Arterial -3 9 mmol/L      O2 Content, Arterial 18 2 mL/dL      O2 HGB,Arterial  99 4 (H) %      SOURCE Radial, Left     GIFTY TEST Yes     Vent Type- AC AC     AC Rate 16     Tidal Volume 500 ml      Inspired Air (FIO2) 70     PEEP 5    Blood culture [[id number]]     Lab Status:  No result Specimen:  Blood     Blood culture [[id number]]     Lab Status:  No result Specimen:  Blood     Lactic acid, plasma [[id number]]  (Abnormal) Collected:  04/28/18 1456    Lab Status:  Final result Specimen:  Blood from Arm, Left Updated:  04/28/18 1528     LACTIC ACID 3 1 (HH) mmol/L     Narrative:         Result may be elevated if tourniquet was used during collection  Basic metabolic panel [[id number]]  (Abnormal) Collected:  04/28/18 1500    Lab Status:  Final result Specimen:  Blood from Arm, Left Updated:  04/28/18 1527     Sodium 140 mmol/L      Potassium 4 4 mmol/L      Chloride 102 mmol/L      CO2 30 mmol/L      Anion Gap 8 mmol/L      BUN 21 mg/dL      Creatinine 1 35 (H) mg/dL      Glucose 292 (H) mg/dL      Calcium 8 5 mg/dL      eGFR 55 ml/min/1 73sq m     Narrative:         National Kidney Disease Education Program recommendations are as follows:  GFR calculation is accurate only with a steady state creatinine  Chronic Kidney disease less than 60 ml/min/1 73 sq  meters  Kidney failure less than 15 ml/min/1 73 sq  meters      B-type natriuretic peptide [[id number]]  (Abnormal) Collected:  04/28/18 1500    Lab Status:  Final result Specimen:  Blood from Arm, Left Updated:  04/28/18 1527     NT-proBNP 169 (H) pg/mL     POCT Chem 8+ [[id number]]  (Abnormal) Collected:  04/28/18 1459    Lab Status:  Final result Updated:  04/28/18 1504     SODIUM, I-STAT 139 mmol/l      Potassium, i-STAT 4 3 mmol/L      Chloride, istat 102 mmol/L CO2, i-STAT 28 mmol/L      Anion Gap, Istat 14 (H) mmol/L      Calcium, Ionized i-STAT 1 16 mmol/L      BUN, I-STAT 23 mg/dl      Creatinine, i-STAT 1 1 mg/dl      eGFR 70 ml/min/1 73sq m      Glucose, i-STAT 287 (H) mg/dl      Hct, i-STAT 44 %      Hgb, i-STAT 15 0 g/dl      Specimen Type VENOUS    Blood culture #1 [[id number]] Collected:  04/28/18 1456    Lab Status: In process Specimen:  Blood from Arm, Left Updated:  04/28/18 1503    CBC and differential [[id number]]  (Abnormal) Collected:  04/28/18 1432    Lab Status:  Final result Specimen:  Blood from Arm, Right Updated:  04/28/18 1455     WBC 21 77 (H) Thousand/uL      RBC 6 02 (H) Million/uL      Hemoglobin 15 5 g/dL      Hematocrit 50 8 (H) %      MCV 84 fL      MCH 25 7 (L) pg      MCHC 30 5 (L) g/dL      RDW 14 4 %      MPV 10 0 fL      Platelets 253 Thousands/uL     Narrative: This is an appended report  These results have been appended to a previously verified report  Troponin I [[id number]]  (Normal) Collected:  04/28/18 1432    Lab Status:  Final result Specimen:  Blood from Arm, Right Updated:  04/28/18 1455     Troponin I 0 02 ng/mL     Narrative:         [employer] Chemistry analyzer 99% cutoff is > 0 04 ng/mL in network labs    o cTnI 99% cutoff is useful only when applied to patients in the clinical setting of myocardial ischemia  o cTnI 99% cutoff should be interpreted in the context of clinical history, ECG findings and possibly cardiac imaging to establish correct diagnosis  o cTnI 99% cutoff may be suggestive but clearly not indicative of a coronary event without the clinical setting of myocardial ischemia  XR chest 1 view portable   ED Interpretation by DO Kellen (04/28 1559)   Appropriate ET tube placement      Final Result by Connie Gutierrez MD (04/28 1640)      Emphysematous changes are noted  No focal consolidation, pleural effusion, or pneumothorax              Workstation performed: [id number]         XR chest 1 view portable   ED Interpretation by DO Kellen (04/28 7740)   No infiltrate  No venous congestion  Final Result by Jhon Hair MD (04/28 0345)      Emphysematous changes are noted  No focal consolidation, pleural effusion, or pneumothorax              Workstation performed: [id number]                    Procedures  ECG 12 Lead Documentation  Date/Time: 4/28/2018 2:38 PM  Performed by:  Alejandro by: Rusty Kumar     Indications / Diagnosis:  Resp distress  ECG reviewed by me, the ED Provider: yes    Patient location:  Bedside  Quality:     Tracing quality:  Limited by artifact  Rate:     ECG rate:  126    ECG rate assessment: tachycardic    Rhythm:     Rhythm: sinus tachycardia    Ectopy:     Ectopy: none      CriticalCare Time  Performed by: Rusty Kumar  Authorized by: Rusty Kumar     Critical care provider statement:     Critical care time (minutes):  60    Critical care time was exclusive of:  Separately billable procedures and treating other patients and teaching time    Critical care was necessary to treat or prevent imminent or life-threatening deterioration of the following conditions:  Respiratory failure    Critical care was time spent personally by me on the following activities:  Blood draw for specimens, obtaining history from patient or surrogate, development of treatment plan with patient or surrogate, discussions with consultants, evaluation of patient's response to treatment, examination of patient, interpretation of cardiac output measurements, ordering and performing treatments and interventions, ordering and review of laboratory studies, ordering and review of radiographic studies, re-evaluation of patient's condition and review of old charts    I assumed direction of critical care for this patient from another provider in my specialty: no             Phone Contacts  ED Phone Contact    ED Course  ED Course as of Apr 28 1858   Sat Apr 28, 2018   1503 At this point, despite having a leukocytosis (which I believe could be a stress response from the resp distress), I will withhold abx as there is no infiltrate on CXR, pt has no fever  Symptoms likely from COPD exacerbation  Pt still in severe resp distress and using accessory muscles despite BiPAP/meds  Pt is declining intubation  I told him that he may likely die without it  He is not altered and can make decisions  Will continue to observe on BiPAP     1522 Pt now agreeable to intubation  1534 CC attending paged    [phone] RR NP paged  Discussed case with Ed     1623 Ed from T[facility] De Los Santos here to see pt     1811 Called to bedside after pt started coding while en route to ICU  Pt was disconnected from the vent and bagged  Easy bagging per respiratory  Pt has wheezing b/l  CPR was started when pt returned to room  ACLS protocol was started  Please refer to code documentation  1817 Pt pronounced dead by Ed, Tiffany De Los Santos NP at [address]     [address] Updated pt's emergency contact Sangeeta Ng regarding pt's death  He states pt has been SOB for the past several days and refused to go to the hospital  He states pt has been using his inhaler without relief and had to use Lei's inhaler  Sangeeta Ng reports pt has been intubated in the past (he showed me a picture of pt intubated in the ICU)  He states pt has to use oxygen at home  He notes pt worsened today and pt called him from the other room to call for EMS  Initial Sepsis Screening     Row Name 04/28/18 6127                Is the patient's history suggestive of a new or worsening infection? (!)  Yes (Proceed)  -EG        Suspected source of infection suspect infection, source unknown  -EG        Are two or more of the following signs & symptoms of infection both present and new to the patient? (!)  Yes (Proceed)  -EG        Indicate SIRS criteria Leukocytosis (WBC > 63366 IJL); Tachypnea > 20 resp per min  -EG        If the answer is yes to both questions, suspicion of sepsis is present          If severe sepsis is present AND tissue hypoperfusion perists in the hour after fluid resuscitation or lactate > 4, the patient meets criteria for SEPTIC SHOCK          Are any of the following organ dysfunction criteria present within 6 hours of suspected infection and SIRS criteria that are NOT considered to be chronic conditions? (!)  Yes  -EG        Organ dysfunction Lactate > 2 0 mmol/L;Acute respiratory failure (new need for invasive or non-invasive mechanical ventilation)  -EG        Date of presentation of severe sepsis          Time of presentation of severe sepsis          Tissue hypoperfusion persists in the hour after crystalloid fluid administration, evidenced, by either:          Was hypotension present within one hour of the conclusion of crystalloid fluid administration? No  -EG        Date of presentation of septic shock          Time of presentation of septic shock            User Key  (r) = Recorded By, (t) = Taken By, (c) = Cosigned By    [address] Name Provider Type    EG JULIO CÉSAR Mckeon Nurse Practitioner           Default Flowsheet Data (last 720 hours)      Sepsis Reassessment     Row Name 04/28/18 3347                   Volume Status and Tissue Perfusion Post Fluid Resuscitation- Must Document ALL of the Following:    Vital Signs Reviewed Yes  -EG        Cardio Regular rate and rhythm; No murmor; No rub or gallop  -EG        Pulmonary (!)  Wheezes  -EG        Capillary Refill Brisk  -EG        Peripheral Pulses Radial;Posterior Tibialis;Dorsalis Pedis  -EG        Peripheral Pulse +3  -EG        Dorsalis Pedis +2  -EG        Posterior Tibialis +3  -EG        Skin Warm;Dry  -EG           *OR*   Intensive Monitoring- Must Document Two * of the Following Four *:    Vital Signs Reviewed          * Central Venous Pressure (CVP or RAP)          * Central Venous Oxygen (SVO2, ScvO2 or Oxygen saturation via central catheter)   * Bedside Cardiovascular US in IVC diameter and % collapse          * Passive Leg Raise OR Crystalloid Challenge            User Key  (r) = Recorded By, (t) = Taken By, (c) = Cosigned By    Initials Name Provider Type    EG Jacques Jones, Leandro Corona Nurse Practitioner                MDM  Number of Diagnoses or Management Options  Acute respiratory failure ([facility] ):   COPD exacerbation [facility]):   Diagnosis management comments: Respiratory distress likely 2/2 COPD exacerbation - Will check CBC as marker of infection, BMP to r/o metabolic derangement/uremia, troponin to assess for NSTEMI, EKG to r/o ischemic changes, lactate to r/o lactic acidosis, CXR to r/o PNA  Pt started on BiPAP, braga neb, steroids, mg   Will observe closely as pt may need intubation  Amount and/or Complexity of Data Reviewed  Clinical lab tests: reviewed and ordered  Tests in the radiology section of CPT®: ordered and reviewed  Tests in the medicine section of CPT®: ordered and reviewed      CritCare Time    Disposition  Final diagnoses:   Acute respiratory failure ([facility] )   COPD exacerbation ([facility] )     Time reflects when diagnosis was documented in both MDM as applicable and the Disposition within this note     Time User Action Codes Description Comment    4/28/2018  3:35 PM Morelia Wise [J96 00] Acute respiratory failure ([facility] )     4/28/2018  3:35 PM Maurilio Wise [J44 1] COPD exacerbation [facility])       ED Disposition     ED Disposition Condition Comment    Admit  Case was discussed with Dr Apple Clarke and the patient's admission status was agreed to be Admission Status: inpatient status to the service of Dr Apple Clarke          Follow-up Information    None       Current Discharge Medication List      STOP taking these medications       aspirin 81 mg chewable tablet Comments:   Reason for Stopping:         hydrochlorothiazide (HYDRODIURIL) 12 5 mg tablet Comments:   Reason for Stopping:         insulin glargine (LANTUS) 100 units/mL subcutaneous injection Comments:   Reason for Stopping:         ipratropium (ATROVENT) 0 02 % nebulizer solution Comments:   Reason for Stopping:         isosorbide mononitrate (IMDUR) 60 mg 24 hr tablet Comments:   Reason for Stopping:         nitroglycerin (NITROSTAT) 0 4 mg SL tablet Comments:   Reason for Stopping:         omeprazole (PriLOSEC OTC) 20 MG tablet Comments:   Reason for Stopping:         lisinopril (ZESTRIL) 20 mg tablet Comments:   Reason for Stopping:         metFORMIN (GLUCOPHAGE) 850 mg tablet Comments:   Reason for Stopping:             No discharge procedures on file      ED Provider  Electronically Signed by           Miya Horan 26, DO  04/28/18 [address], DO  04/28/18 9816

## 2018-04-28 NOTE — ED NOTES
Propofol drip initiated at this time per Dr Yadi Griffin  Have not received Ketamine drip from Pharmacy and pt requiring continued boluses to keep sedated   Drip initiated at 10mcg/kg/min     Shellie Wiley RN  04/28/18 8316

## 2018-04-28 NOTE — ED NOTES
Pt noted to be cool and clammy  Dr Nargis Mckinney made aware  Plan to intubate  RT called and made aware  RT at bedside       Clay Billy RN  04/28/18 [address]  Libby Medina RN  04/28/18 5264

## 2018-04-28 NOTE — ED NOTES
Blood culture #2 was not collected until this time from L wrist  Called and spoke to [facility] in Lab and informed that collection information updated on lab sticker  She expressed understanding        Mina Hunt RN  04/28/18 2496

## 2018-04-28 NOTE — ED NOTES
Cy Smiley called and provided with hospital supervisor number to call when  home is decided      Ph#: [phone]     Erna Miramontes RN  18 [phone] M  [facility] Ruma Weaver RN  [phone]

## 2018-04-28 NOTE — ED NOTES
While transporting pt to ICU, RN noted change in cardiac rhythm to a bradycardia wide complex rhythm with no palpable pulse  Pt brought back to room, Dr Mamie Raza called to bedside confirmed no palpable pulse and CPR initiated       Mina Hunt RN  04/28/18 4277

## 2018-04-28 NOTE — Clinical Note
Case was discussed with Dr Ismael Kerr and the patient's admission status was agreed to be Admission Status: inpatient status to the service of Dr Ismael Kerr

## 2018-04-28 NOTE — DISCHARGE SUMMARY
Death Note  Valerio Keane 74 y o  male MRN: [id number]  Unit/Bed#: ED 28 Encounter: [id number]      Situation:  Called to bedside to pronounce death  Background:  Valerio Keane is a 74y o  year old male who presented to the hospital with COPD exacerbation  Patient had been intubated in the emergency department and treated appropriately with steroids and nebulizer treatments  He was sedated a propofol drip  He was hemodynamically stable  Patient was about to be transported to the intensive care unit when he became pulseless and dropped his blood pressure  ACLS was initiated and continued for greater than 30 minutes  After 30 minutes a pulse check demonstrated that the patient was in pulseless electrical activity  There were no respirations  Electrical activity on the monitor quickly deteriorated to VFib  Ultrasound of the heart demonstrated no contractility  Patient was pronounced at 18:09 on 28 April 2018  Historical Information   Past Medical History:   Diagnosis Date    Asthma     Cardiac disease     Chronic pain     neck pain    COPD [facility]chronic obstructive pulmonary disease) ([facility]     Diabetes mellitus ([address] )     Hypertension      Past Surgical History:   Procedure Laterality Date    ABDOMINAL SURGERY      ulcer    CARDIAC SURGERY         Assessment:  Called to patients bedside to pronounce that patient dead  No spontaneous movements were present  There was not response to verbal or tactile stimuli  Pupils were dilated and fixed  No breath sounds were appreciated over either lung field  No carotid pulses were palpable  No heart sounds were auscultator over entire precordium        Meds/Allergies     Current Facility-Administered Medications:     [START ON 4/29/2018] aspirin chewable tablet 81 mg, 81 mg, Oral, Daily    cefepime (MAXIPIME) IVPB (premix) 1,000 mg, 1,000 mg, Intravenous, Q12H, 1,000 mg at 04/28/18 1726    chlorhexidine (PERIDEX) 0 12 % oral rinse 15 mL, 15 mL, Swish & Spit, Q12H [address]    [START ON 4/29/2018] enoxaparin (LOVENOX) subcutaneous injection 40 mg, 40 mg, Subcutaneous, Daily    hydrALAZINE (APRESOLINE) injection 10 mg, 10 mg, Intravenous, Q6H PRN    insulin glargine (LANTUS) subcutaneous injection 30 Units 0 3 mL, 30 Units, Subcutaneous, HS    insulin lispro (HumaLOG) 100 units/mL subcutaneous injection 2-12 Units, 2-12 Units, Subcutaneous, Q6H [address] **AND** Fingerstick Glucose (POCT), , , Q6H    ipratropium (ATROVENT) 0 02 % inhalation solution 0 5 mg, 0 5 mg, Nebulization, Q6H PRN    ketamine 250 mg (STANDARD CONCENTRATION) IV in sodium chloride 0 9% 250 mL, 0 5 mg/kg/hr, Intravenous, Continuous    levalbuterol (XOPENEX) inhalation solution 1 25 mg, 1 25 mg, Nebulization, Q6H PRN    methylPREDNISolone sodium succinate (Solu-MEDROL) injection 40 mg, 40 mg, Intravenous, Q6H [address]    [START ON 4/29/2018] pantoprazole (PROTONIX) injection 40 mg, 40 mg, Intravenous, Q24H JEROMY    propofol (DIPRIVAN) 1000 mg in 100 mL infusion (premix), 5-50 mcg/kg/min, Intravenous, Titrated, 50 mcg/kg/min at 04/28/18 0102    Current Outpatient Prescriptions:     aspirin 81 mg chewable tablet    hydrochlorothiazide (HYDRODIURIL) 12 5 mg tablet    insulin glargine (LANTUS) 100 units/mL subcutaneous injection    ipratropium (ATROVENT) 0 02 % nebulizer solution    isosorbide mononitrate (IMDUR) 60 mg 24 hr tablet    nitroglycerin (NITROSTAT) 0 4 mg SL tablet    omeprazole (PriLOSEC OTC) 20 MG tablet    lisinopril (ZESTRIL) 20 mg tablet    metFORMIN (GLUCOPHAGE) 850 mg tablet  No Known Allergies    Recommendation:  Patient pronounced dead at 1809  Family and Attending physician were notified  Jaqui and postmortem services offered to the family  Patients major medical illness was chronic obstructive pulmonary disease with coronary artery disease  Time of death was    , confirmed and witnessed by Nurse Jeannie Dial  Code Status: Level 1 - Full Code at time of death

## 2018-04-28 NOTE — SEPSIS NOTE
Sepsis Note   Eleuterio Louis 74 y o  male MRN: [id number]  Unit/Bed#: ED 32 Encounter: [id number]            Initial Sepsis Screening     Row Name 04/28/18 0893                Is the patient's history suggestive of a new or worsening infection? (!)  Yes (Proceed)  -EG        Suspected source of infection suspect infection, source unknown  -EG        Are two or more of the following signs & symptoms of infection both present and new to the patient? (!)  Yes (Proceed)  -EG        Indicate SIRS criteria Leukocytosis (WBC > 55992 IJL); Tachypnea > 20 resp per min  -EG        If the answer is yes to both questions, suspicion of sepsis is present          If severe sepsis is present AND tissue hypoperfusion perists in the hour after fluid resuscitation or lactate > 4, the patient meets criteria for SEPTIC SHOCK          Are any of the following organ dysfunction criteria present within 6 hours of suspected infection and SIRS criteria that are NOT considered to be chronic conditions? (!)  Yes  -EG        Organ dysfunction Lactate > 2 0 mmol/L;Acute respiratory failure (new need for invasive or non-invasive mechanical ventilation)  -EG        Date of presentation of severe sepsis          Time of presentation of severe sepsis          Tissue hypoperfusion persists in the hour after crystalloid fluid administration, evidenced, by either:          Was hypotension present within one hour of the conclusion of crystalloid fluid administration?  No  -EG        Date of presentation of septic shock          Time of presentation of septic shock            User Key  (r) = Recorded By, (t) = Taken By, (c) = Cosigned By    [address] Name Provider Type    EG JULIO CÉSAR Blake Nurse Romero

## 2018-04-28 NOTE — ED NOTES
RN was initially going to decrease propofol drip d/t hypotension however Pt noted to be over breathing vent and gaging on ET tube  Repeat /105        Fred Marley RN  04/28/18 [address] [facility] Sebas Shen RN  04/28/18 8849

## 2018-04-28 NOTE — ED PROCEDURE NOTE
Procedure  Intubation  Date/Time: 4/28/2018 3:16 PM  Performed by: Sukhi Shelton by: Justice Briggs     Patient location:  ED and bedside  Other Assisting Provider: No    Consent:     Consent obtained:  Verbal and emergent situation    Consent given by:  Patient    Alternatives discussed:  No treatment  Universal protocol:     Procedure explained and questions answered to patient or proxy's satisfaction: yes      Imaging studies available: yes      Immediately prior to procedure, a time out was called: yes      Patient identity confirmed:  Arm band  Pre-procedure details:     Patient status:  Awake    Pretreatment medications:  Ketamine    Paralytics:  Succinylcholine  Indications:     Indications for intubation: respiratory distress and respiratory failure    Procedure details:     Preoxygenation:  Bag valve mask    CPR in progress: no      Intubation method:  Oral    Oral intubation technique:  Direct    Laryngoscope blade: Mac 4    Tube size (mm):  8 0    Tube type:  Cuffed    Number of attempts:  1    Cricoid pressure: yes      Tube visualized through cords: yes    Placement assessment:     ETT to teeth:  24    Tube secured with:  ETT jimenes    Breath sounds:  Equal    Placement verification: chest rise, condensation, direct visualization, equal breath sounds and ETCO2 detector    Post-procedure details:     Patient tolerance of procedure:   Tolerated well, no immediate complications                     Tammy Quintero MD  04/28/18 1527

## 2018-04-28 NOTE — ED NOTES
Pt continues to not appear sedated  Dr Sissy Jimenez and Dr Lily Gaona made aware  Dr Lily Gaona will push 80mg Propofol       Elysia Fair RN  04/28/18 1424

## 2018-04-28 NOTE — H&P
History & Physical Exam - Critical Care   Paco Martinez 74 y o  male MRN: [id number]  Unit/Bed#: ED 32 Encounter: [id number]      Assessment/Plan:  1  Acute on chronic hypercapnic respiratory failure  · Patient was intubated in the emergency department  Continue mechanical ventilation  Updraft nebulizer treatments  Steroids  2  COPD with acute exacerbation  · Continue steroids, nebulizer treatments, mechanical ventilation  blood cultures pending  Empiric antibiotics, given elevated white count and lactic acidosis  3  Diabetes mellitus type 2  · Sliding scale insulin, and nighttime Lantus  Hold outpatient medications  4  Hypertension  · Patient is on propofol for sedation which is controlling his hypertension at the moment  Hydralazine p r n  for systolic blood pressure greater than 160 mm of mercury is ordered  5  Coronary artery disease continue aspirin      Critical Care Time:   Documented critical care time excludes any procedures documented elsewhere  It also excludes any family updates    _____________________________________________________________________      HPI:    Paco Martinez is a 74 y o  male who presents with complaints of 3 days of progressive shortness of breath  He has a known history of COPD and coronary artery disease as well as hypertension  He did experience some chest pain at home which resolved on its own  Additionally, he has a history of AV node dysfunction and has a permanent pacemaker placed  Upon my arrival to the emergency department the patient is intubated and sedated  He is on 50 mics of propofol  He is noncommunicative  Information is taken from emergency department personnel and EMS records  There are no family present  Apparently the patient activated EMS when he was having severe shortness of breath at home    Upon arrival EMS found the patient to be struggling to breathe they gave him a heart neb and placed him on CPAP and immediately transported him to [place]  [facility] Emergency Department  Upon arrival to the emergency department the patient declined intubation  He was therefore treated with magnesium, Solu-Medrol, heart neb, and BiPAP  Patient did not improve and continued to struggle to breathe  He eventually he agreed to intubation  He will be admitted to the intensive care unit where he is expected to require greater than 2 midnight stay  Dr Ira Ballard notified  Review of Systems:  Unable to obtain      Historical Information   Past Medical History:   Diagnosis Date    Asthma     Cardiac disease     Chronic pain     neck pain    [facility]     Diabetes mellitus ([facility] )     Hypertension      Past Surgical History:   Procedure Laterality Date    ABDOMINAL SURGERY      ulcer    CARDIAC SURGERY       Social History   History   Alcohol Use No     Comment: Denied alcohol use  History   Drug Use No     Comment: Denied use of any drugs       History   Smoking Status    Former Smoker    Years: 60 00    Quit date: 10/7/2016   Smokeless Tobacco    Former User    Quit date: 10/7/2016       Family History:   Family History   Problem Relation Age of Onset    Asthma Brother        Medications:  Pertinent medications were reviewed    Current Facility-Administered Medications:  [START ON 4/29/2018] aspirin 81 mg Oral Daily [place] , CRNP    cefepime 1,000 mg Intravenous Q12H [place] , CRNP    chlorhexidine 15 mL Swish & Spit Q12H [address] [place] , CRNP    [START ON 4/29/2018] enoxaparin 40 mg Subcutaneous Daily [place] , CRNP    hydrALAZINE 10 mg Intravenous Q6H PRN [place] , CRNP    insulin glargine 30 Units Subcutaneous HS [place] , CRNP    insulin lispro 2-12 Units Subcutaneous Q6H [address] [place] , CRNP    ipratropium 0 5 mg Nebulization 4x Daily [place] , CRNP    ketamine 0 5 mg/kg/hr Intravenous Continuous Crystal L Frandy, DO    levalbuterol 1 25 mg Nebulization Q6H PRN JULIO CÉSAR Cruz    methylPREDNISolone sodium succinate 40 mg Intravenous Q6H [address] Partha JULIO CÉSAR Shelton    [START ON 4/29/2018] pantoprazole 40 mg Intravenous Q24H [address] Partha JULIO CÉSAR Shelton    propofol 5-50 mcg/kg/min Intravenous Titrated Crystal L Frandy, DO Last Rate: 40 mcg/kg/min (04/28/18 1636)   sodium chloride 1,000 mL Intravenous Once Crystal L Frandy, DO Last Rate: 1,000 mL (04/28/18 1555)         No Known Allergies      Vitals:   /58 (BP Location: Right arm)   Pulse 96   Temp 98 5 °F (36 9 °C) (Temporal)   Resp 16   Wt 96 3 kg (212 lb 4 9 oz)   SpO2 99%   BMI 33 25 kg/m²   Body mass index is 33 25 kg/m²  SpO2: 99 %,   SpO2 Activity: At Rest,   O2 Device:  (Vent)      Intake/Output Summary (Last 24 hours) at 04/28/18 1652  Last data filed at 04/28/18 1633   Gross per 24 hour   Intake             1050 ml   Output              100 ml   Net              950 ml     Invasive Devices     Peripheral Intravenous Line            Peripheral IV 04/28/18 Left Antecubital less than 1 day    Peripheral IV 04/28/18 Right Hand less than 1 day          Drain            NG/OG/Enteral Tube Orogastric 16 Fr Center mouth less than 1 day    Urethral Catheter Temperature probe 16 Fr  less than 1 day          Airway            ETT  Cuffed 8 mm less than 1 day                Physical Exam:  Gen:  Intubated and sedated  HEENT:  Atraumatic normocephalic pupils equal round reactive to light extraocular movements intact sclerae are mildly injected  Neck:  Supple no JVD no lymphadenopathy  Chest:  Wheezes bilaterally breath sounds distant  Cor:  Heart sounds difficulty here secondary to body habitus and wheezing  Regular rhythm no murmurs appreciated  Abd:  Soft nontender with hypoactive bowel sounds  Ext:  No clubbing cyanosis or edema appreciated  Neuro:  Intubated and sedated  Skin:  Warm dry and intact      Diagnostic Data:  Lab: I have personally reviewed pertinent lab results  ,   CBC:    Results from last 7 days  Lab Units 04/28/18  1459 04/28/18  1432   WBC Thousand/uL  --  21 77*   HEMOGLOBIN g/dL  --  15 5   I STAT HEMOGLOBIN g/dl 15 0  --    HEMATOCRIT %  --  50 8*   PLATELETS Thousands/uL  --  293      CMP: Lab Results   Component Value Date     04/28/2018    K 4 4 04/28/2018     04/28/2018    CO2 30 04/28/2018    ANIONGAP 8 04/28/2018    BUN 21 04/28/2018    CREATININE 1 35 (H) 04/28/2018    GLUCOSE 292 (H) 04/28/2018    GLUCOSE 287 (H) 04/28/2018    CALCIUM 8 5 04/28/2018    EGFR 55 04/28/2018    EGFR 70 04/28/2018   ,   PT/INR: No results found for: PT, INR,   Magnesium: No results found for: MAG,  Phosphorous: No results found for: PHOS    ABG: Lab Results   Component Value Date    PHART 7 257 (L) 04/28/2018    YQR8DJB 54 4 (H) 04/28/2018    PO2ART 372 1 (H) 04/28/2018    HKC5MZI 23 7 04/28/2018    BEART -3 9 04/28/2018    SOURCE Radial, Left 04/28/2018   ,     Microbiology:  Blood cultures ordered and pending    Imaging: I have personally reviewed the pertinent imaging studies on the PACS system  Chest x-ray shows ET tube in good position, OG tube in good position, constellation of radiologic indices associated with COPD  Cardiac/EKG/telemetry/Echo:     Paced rhythm  VTE Prophylaxis: Sequential compression device (Venodyne)  Lovenox    Code Status: Level 1 - Full Code    JULIO CÉSAR Lopez    Portions of the record may have been created with voice recognition software  Occasional wrong word or "sound a like" substitutions may have occurred due to the inherent limitations of voice recognition software  Read the chart carefully and recognize, using context, where substitutions have occurred

## 2018-04-28 NOTE — ED NOTES
Ketamine 75mg IVP by this RN       Mina Hunt, KING  04/28/18 [address] Gloria Wheeler RN  04/28/18 9877

## 2018-04-28 NOTE — SEPSIS NOTE
Sepsis Note   Wolf Montague 74 y o  male MRN: [id number]  Unit/Bed#: ED 32 Encounter: [id number]            Initial Sepsis Screening     Row Name 04/28/18 8001                Is the patient's history suggestive of a new or worsening infection? (!)  Yes (Proceed)  -EG        Suspected source of infection suspect infection, source unknown  -EG        Are two or more of the following signs & symptoms of infection both present and new to the patient? (!)  Yes (Proceed)  -EG        Indicate SIRS criteria Leukocytosis (WBC > 46546 IJL); Tachypnea > 20 resp per min  -EG        If the answer is yes to both questions, suspicion of sepsis is present          If severe sepsis is present AND tissue hypoperfusion perists in the hour after fluid resuscitation or lactate > 4, the patient meets criteria for SEPTIC SHOCK          Are any of the following organ dysfunction criteria present within 6 hours of suspected infection and SIRS criteria that are NOT considered to be chronic conditions? (!)  Yes  -EG        Organ dysfunction Lactate > 2 0 mmol/L;Acute respiratory failure (new need for invasive or non-invasive mechanical ventilation)  -EG        Date of presentation of severe sepsis          Time of presentation of severe sepsis          Tissue hypoperfusion persists in the hour after crystalloid fluid administration, evidenced, by either:          Was hypotension present within one hour of the conclusion of crystalloid fluid administration?  No  -EG        Date of presentation of septic shock          Time of presentation of septic shock            User Key  (r) = Recorded By, (t) = Taken By, (c) = Cosigned By    [address] Name Provider Type    EG JULIO CÉSAR Coker Nurse Practitioner

## 2018-04-29 NOTE — ED NOTES
Yodit Klein, patient's friend, called at this time by Patricia Husain  Yodit Harringtonward confirms that he was friend of patient, and not a related family member  Reports that he was the patient's roommate, living at:    [address] [facility], [address] [address] reports that patient's family/close friends are from [place], and that he has reached out to a  home in [place] regarding transportation, prices, and arrangements in hopes of having body transported from [address] [place] [address] to [place]  Shares that he was instructed to call  home back tomorrow morning, , at 10AM for followup/more information    Yodit Klein reports that patient's sister, More Guillermo, was informed of death by himself, and is available by phone at [phone]           Nathan Menezes RN  18

## 2018-04-29 NOTE — ED NOTES
Return phone call from [employer] at this time  Phone number for patient's sister, Cesar Sotomayor, provided          Priti Pickering RN  04/28/18 2038

## 2018-04-30 LAB
ATRIAL RATE: 96 BPM
QRS AXIS: -60 DEGREES
QRS AXIS: -85 DEGREES
QRSD INTERVAL: 140 MS
QRSD INTERVAL: 140 MS
QT INTERVAL: 306 MS
QT INTERVAL: 360 MS
QTC INTERVAL: 414 MS
QTC INTERVAL: 455 MS
T WAVE AXIS: -42 DEGREES
T WAVE AXIS: 88 DEGREES
VENTRICULAR RATE: 110 BPM
VENTRICULAR RATE: 96 BPM

## 2018-04-30 NOTE — CASE MANAGEMENT
Initial Clinical Review    Admission: Date/Time/Statement: 4/28/18 @ 1555     Orders Placed This Encounter   Procedures    Inpatient Admission (expected length of stay for this patient is greater than two midnights)     Standing Status:   Standing     Number of Occurrences:   1     Order Specific Question:   Admitting Physician     Answer:   Thiago Perrin [[id number]]     Order Specific Question:   Level of Care     Answer:   Critical Care [15]     Order Specific Question:   Estimated length of stay     Answer:   More than 2 Midnights     Order Specific Question:   Certification     Answer:   I certify that inpatient services are medically necessary for this patient for a duration of greater than two midnights  See H&P and MD Progress Notes for additional information about the patient's course of treatment  ED: Date/Time/Mode of Arrival:   ED Arrival Information     Expected Arrival Acuity Means of Arrival Escorted By Service Admission Type    4/28/2018 14:25 4/28/2018 14:17 Emergent Ambulance [facility] Critical Care/ICU Emergency    Arrival Complaint    SHORTNESS OF BREATH          Chief Complaint:   Chief Complaint   Patient presents with    Shortness of Breath     Per EMS pt with increased SOB x3 days worse today, chest pain yesterday resolved since, did neb tx at home without relief called 911  denies fever, cough, congestion  History of Illness:     Carla Arevalo is a 74 y o  male who presents with complaints of 3 days of progressive shortness of breath  He has a known history of COPD and coronary artery disease as well as hypertension  He did experience some chest pain at home which resolved on its own  Additionally, he has a history of AV node dysfunction and has a permanent pacemaker placed      Upon my arrival to the emergency department the patient is intubated and sedated  He is on 50 mics of propofol  He is noncommunicative    Information is taken from emergency department personnel and EMS records  There are no family present      Apparently the patient activated EMS when he was having severe shortness of breath at home  Upon arrival EMS found the patient to be struggling to breathe they gave him a heart neb and placed him on CPAP and immediately transported him to [place] [address] Emergency Department  Upon arrival to the emergency department the patient declined intubation  He was therefore treated with magnesium, Solu-Medrol, heart neb, and BiPAP  Patient did not improve and continued to struggle to breathe  He eventually he agreed to intubation      He will be admitted to the intensive care unit where he is expected to require greater than 2 midnight stay  Dr Ismael Kerr notified        ED Vital Signs:   ED Triage Vitals   Temperature Pulse Respirations Blood Pressure SpO2   04/28/18 1644 04/28/18 1431 04/28/18 1431 04/28/18 1431 04/28/18 1431   98 5 °F (36 9 °C) 100 (!) 32 (!) 233/136 99 %      Temp Source Heart Rate Source Patient Position - Orthostatic VS BP Location FiO2 (%)   04/28/18 1644 04/28/18 1431 04/28/18 1431 04/28/18 1431 --   Temporal Monitor Sitting Left arm       Pain Score       --               Wt Readings from Last 1 Encounters:   04/28/18 96 3 kg (212 lb 4 9 oz)       Vital Signs (abnormal):    above    Abnormal Labs/Diagnostic Test Results:    Lactic  Acid  3 1  Creat   1 35  BNP   169  WBC   21 77  ABG  On   Vent   Ph  7 257      PCo2      54 4     Po2     372 1  CXR:     Emphysematous changes are noted   No focal consolidation, pleural effusion, or pneumothorax      ED Treatment:   Medication Administration from 04/28/2018 1417 to 04/30/2018 1227       Date/Time Order Dose Route Action Action by Comments     04/28/2018 1427  EMS REPLENISHMENT MED 0  Does not apply Given to EMS Aurea Greene RN      04/28/2018 1457 magnesium sulfate 2 g/50 mL IVPB (premix) 2 g 0 g Intravenous Stopped Akila Love RN      04/28/2018 1430 magnesium sulfate 2 g/50 mL IVPB (premix) 2 g 2 g Intravenous KING Smith      04/28/2018 1441 albuterol inhalation solution 10 mg 10 mg Nebulization Given Ashanti M Deysi, RT      04/28/2018 1442 ipratropium (ATROVENT) 0 02 % inhalation solution 1 mg 1 mg Nebulization Given Ashanti MCCARTNEY Deysi, RT      04/28/2018 1442 sodium chloride 0 9 % inhalation solution 3 mL 3 mL Nebulization Given Ashanti MCCARTNEY Deysi, RT      04/28/2018 1428 methylPREDNISolone sodium succinate (Solu-MEDROL) injection 125 mg 125 mg Intravenous Given Stephon Mcgill RN      04/28/2018 1633 sodium chloride 0 9 % bolus 1,000 mL 0 mL Intravenous Stopped Carole Vallejo RN      04/28/2018 1434 sodium chloride 0 9 % bolus 1,000 mL 1,000 mL Intravenous New [address] Stephon Mcgill RN      04/28/2018 1438  EMS REPLENISHMENT MED 0  Does not apply Given to EMS Gisela Dominique RN      04/28/2018 1437 nitroglycerin (NITROSTAT) SL tablet 0 4 mg 0 4 mg Sublingual Given Aurea Greene RN      04/28/2018 1631 ketamine 250 mg (STANDARD CONCENTRATION) IV in sodium chloride 0 9% 250 mL 0 5 mg/kg/hr Intravenous Not Given Carole Vallejo RN Medication order changed     04/28/2018 1722 sodium chloride 0 9 % bolus 1,000 mL 0 mL Intravenous Stopped Carole Vallejo RN      04/28/2018 1555 sodium chloride 0 9 % bolus 1,000 mL 1,000 mL Intravenous New Bag Carole Vallejo RN      04/28/2018 1515 succinylcholine (ANECTINE) 20 mg/mL injection 150 mg 150 mg Intravenous Given by Other KING Padilla RN     04/28/2018 1514 ketamine (KETALAR) 10 mg/mL IV use 150 mg 150 mg Intravenous Given Carole Vallejo RN Given by Annie Hearn RN     04/28/2018 1541 propofol (DIPRIVAN) 200 MG/20ML bolus injection 80 mg 80 mg Intravenous Given by Sterling Vallejo RN by Dr Sparkle Finley     04/28/2018 1552 propofol (DIPRIVAN) 200 MG/20ML bolus injection 80 mg 80 mg Intravenous Given by Other Carole Vallejo RN By Dr Sparkle Finley     04/28/2018 1631 ketamine (KETALAR) 50 mg/mL 150 mg   Intramuscular Not Given Norah Jackson RN Order discontinued     04/28/2018 1809 propofol (DIPRIVAN) 1000 mg in 100 mL infusion (premix) 0 mcg/kg/min Intravenous Stopped Mayur Noguera RN      04/28/2018 1734 propofol (DIPRIVAN) 1000 mg in 100 mL infusion (premix) 50 mcg/kg/min Intravenous Rate/Dose Change Norah Jackson RN      04/28/2018 1730 propofol (DIPRIVAN) 1000 mg in 100 mL infusion (premix) 40 mcg/kg/min Intravenous Rate/Dose Change Norah Jackson RN      04/28/2018 1636 propofol (DIPRIVAN) 1000 mg in 100 mL infusion (premix) 40 mcg/kg/min Intravenous Rate/Dose Change Norah Jackson RN      04/28/2018 1609 propofol (DIPRIVAN) 1000 mg in 100 mL infusion (premix) 50 mcg/kg/min Intravenous Rate/Dose Change Norah Jackson RN Per Maury Lopez PA-C critical care     04/28/2018 1605 propofol (DIPRIVAN) 1000 mg in 100 mL infusion (premix) 20 mcg/kg/min Intravenous Rate/Dose Change Norah Jackson RN      04/28/2018 1554 propofol (DIPRIVAN) 1000 mg in 100 mL infusion (premix) 10 mcg/kg/min Intravenous [address] Norah Jackson RN      04/28/2018 1534 ketamine (KETALAR) 10 mg/mL IV use 76 mg 75 mg Intravenous Given Norah Jackson RN 75 mg IVP as per Dr Alfredo Hoffmann     04/28/2018 1716 ipratropium (ATROVENT) 0 02 % inhalation solution 0 5 mg   Nebulization Canceled Entry Duc Guo, RT      04/28/2018 1809 cefepime (MAXIPIME) IVPB (premix) 1,000 mg 0 mg Intravenous Stopped Mayur Noguera RN      04/28/2018 1726 cefepime (MAXIPIME) IVPB (premix) 1,000 mg 1,000 mg Intravenous New Bag Norah Jackson RN      04/28/2018 1806 EPINEPHrine (ADRENALIN) 1 mg/10 mL injection 1 mg Intravenous Given Jasmin Maria RN      04/28/2018 1805 EPINEPHrine (ADRENALIN) 1 mg/10 mL injection 1 mg Intravenous Given Jasmin Conine, RN      04/28/2018 1802 EPINEPHrine (ADRENALIN) 1 mg/10 mL injection 1 mg Intravenous Given Jasmin Maria RN      04/28/2018 1759 EPINEPHrine (ADRENALIN) 1 mg/10 mL injection 1 mg Intravenous Given Angelito Rajan KING Joseph      04/28/2018 1755 EPINEPHrine (ADRENALIN) 1 mg/10 mL injection 1 mg Intravenous Given Mckenna Burch RN      04/28/2018 1751 EPINEPHrine (ADRENALIN) 1 mg/10 mL injection 1 mg Intravenous Given Mckenna Burch RN      04/28/2018 1748 EPINEPHrine (ADRENALIN) 1 mg/10 mL injection 1 mg Intravenous Given Mckenna Burch RN      04/28/2018 1745 EPINEPHrine (ADRENALIN) 1 mg/10 mL injection 1 mg Intravenous Given Mckenna Burch RN      04/28/2018 1752 sodium bicarbonate 50 mEq/50 mL injection 50 mEq Intravenous Given Mckenna Burch RN           Past Medical/Surgical History: Active Ambulatory Problems     Diagnosis Date Noted    COPD with exacerbation ([place] [address] ) 09/06/2016    Type 2 diabetes mellitus ([place] [address] ) 09/06/2016    Acute respiratory failure with hypercapnia ([place] [address] ) 10/28/2016    Accelerated hypertension 10/28/2016     Resolved Ambulatory Problems     Diagnosis Date Noted    SIRS (systemic inflammatory response syndrome) ([place] [address] ) 10/28/2016    Angina pectoris, unspecified ([place] [address] ) 11/03/2016     Past Medical History:   Diagnosis Date    Asthma     Cardiac disease     Chronic pain     COPD (chronic obstructive pulmonary disease) ([place] [address] )     Diabetes mellitus ([place] [address] )     Hypertension        Admitting Diagnosis: Shortness of breath [R06 02]    Age/Sex: 74 y o  male    1  Assessment/Plan:    Acute on chronic hypercapnic respiratory failure  · Patient was intubated in the emergency department  Continue mechanical ventilation  Updraft nebulizer treatments  Steroids  2  COPD with acute exacerbation  · Continue steroids, nebulizer treatments, mechanical ventilation  blood cultures pending  Empiric antibiotics, given elevated white count and lactic acidosis  3  Diabetes mellitus type 2  · Sliding scale insulin, and nighttime Lantus  Hold outpatient medications  4  Hypertension  · Patient is on propofol for sedation which is controlling his hypertension at the moment  Hydralazine p r n  for systolic blood pressure greater than 160 mm of mercury is ordered  Coronary artery disease continue aspirin    Admission Orders:    IP       @    1555  Scheduled Meds:   Continuous Infusions:   No current facility-administered medications for this encounter  PRN Meds:     Vent  IV  Cefepime  Q 12 hrs  SQ lovenox  Daily  ASA  Daily  Insulin  IV  MG   X 1 pilar  IV  s/medrol  Q   6 hrs  IV    protonix  Daily    Pt       @     6:19  PM    Juan Diego Marie is a 74y o  year old male who presented to the hospital with COPD exacerbation  Patient had been intubated in the emergency department and treated appropriately with steroids and nebulizer treatments  He was sedated a propofol drip  He was hemodynamically stable  Patient was about to be transported to the intensive care unit when he became pulseless and dropped his blood pressure  ACLS was initiated and continued for greater than 30 minutes  After 30 minutes a pulse check demonstrated that the patient was in pulseless electrical activity  There were no respirations  Electrical activity on the monitor quickly deteriorated to VFib  Ultrasound of the heart demonstrated no contractility  Patient was pronounced at 18:09 on 2018  Thank you,  [address] [place] [address] in the [facility] by Cayetano Khan for 2017  Network Utilization Review Department  Phone: [phone]; Fax [phone]  ATTENTION: The Network Utilization Review Department is now centralized for our 7 Facilities  Make a note that we have a new phone and fax numbers for our Department  Please call with any questions or concerns to [phone] and carefully follow the prompts so that you are directed to the right person  All voicemails are confidential  Fax any determinations, approvals, denials, and requests for initial or continue stay review clinical to [phone]   Due to HIGH CALL volume, it would be easier if you could please send faxed requests to expedite your requests and in part, help us provide discharge notifications faster

## 2018-05-02 NOTE — SOCIAL WORK
CM called the patients sisterTootie [phone] via interpretor line #[phone]  Reached the patients sister, she reports that her brother is working on  home arrangements and she does not know the name, number or address of the  home  She explained that they hope to have the body in [place] by Saturday  Telephone number for brotherJason [phone] provided  CM called him using the above interpretor  He identified [address] [place], [place] [address]: [address] [place] [address] as  home they will be using, provided telephone number [phone]  I called the  home, they confirmed family has made arrangements with them, they plan to p/u body on 5/3 around noon

## 2018-05-03 LAB
BACTERIA BLD CULT: NORMAL
BACTERIA BLD CULT: NORMAL

## 2018-07-03 NOTE — PROGRESS NOTES
[facility] TRANSMISSION: BATTERY STATUS "OK"  AP 7 5%  0%  ALL AVAILABLE LEAD PARAMETERS WITHIN NORMAL LIMITS  NO SIGNIFICANT HIGH RATE EPISODES  NORMAL DEVICE FUNCTION   NC
Patient

## 2022-01-17 NOTE — ED NOTES
After speaking with pt, Pt aggreable to intubation, Dr Isabelle Gorman, RT and charge made aware        Sahara Chow, RN  04/28/18 3728 Performed Resulted